# Patient Record
Sex: MALE | Race: WHITE | NOT HISPANIC OR LATINO | Employment: FULL TIME | URBAN - METROPOLITAN AREA
[De-identification: names, ages, dates, MRNs, and addresses within clinical notes are randomized per-mention and may not be internally consistent; named-entity substitution may affect disease eponyms.]

---

## 2018-02-12 ENCOUNTER — HOSPITAL ENCOUNTER (EMERGENCY)
Facility: MEDICAL CENTER | Age: 23
End: 2018-02-12
Payer: COMMERCIAL

## 2018-02-12 VITALS
HEART RATE: 88 BPM | BODY MASS INDEX: 22.5 KG/M2 | WEIGHT: 184.75 LBS | HEIGHT: 76 IN | OXYGEN SATURATION: 95 % | TEMPERATURE: 96.6 F | DIASTOLIC BLOOD PRESSURE: 87 MMHG | SYSTOLIC BLOOD PRESSURE: 134 MMHG | RESPIRATION RATE: 18 BRPM

## 2018-02-12 PROCEDURE — 302449 STATCHG TRIAGE ONLY (STATISTIC)

## 2018-02-12 ASSESSMENT — PAIN SCALES - GENERAL
PAINLEVEL_OUTOF10: 4
PAINLEVEL_OUTOF10: 3

## 2018-02-12 NOTE — ED TRIAGE NOTES
Pt ambulated to triage with   Chief Complaint   Patient presents with   • Seizure     last on approx 2 months ago, had one this morning.  pt was sitting on the couch, nausea, falls forward and tonic clonic seizure - witness, approx 2 mins per person with pt.  pt denies taking any medications,  no neurologist. pt a/o x4 now.  family discreibes postical period     Pt has abrasion to fore head and nose.  Pt Informed regarding triage process and verbalized understanding to inform triage tech or RN for any changes in condition. Placed in lobby.

## 2018-02-14 ENCOUNTER — OFFICE VISIT (OUTPATIENT)
Dept: URGENT CARE | Facility: PHYSICIAN GROUP | Age: 23
End: 2018-02-14
Payer: COMMERCIAL

## 2018-02-14 VITALS
HEART RATE: 60 BPM | OXYGEN SATURATION: 98 % | RESPIRATION RATE: 16 BRPM | DIASTOLIC BLOOD PRESSURE: 74 MMHG | SYSTOLIC BLOOD PRESSURE: 122 MMHG | TEMPERATURE: 98.8 F | BODY MASS INDEX: 23.75 KG/M2 | WEIGHT: 191 LBS | HEIGHT: 75 IN

## 2018-02-14 DIAGNOSIS — G40.909 SEIZURE DISORDER (HCC): ICD-10-CM

## 2018-02-14 PROCEDURE — 99214 OFFICE O/P EST MOD 30 MIN: CPT | Performed by: FAMILY MEDICINE

## 2018-02-14 RX ORDER — LEVETIRACETAM 500 MG/1
500 TABLET ORAL 2 TIMES DAILY
Qty: 60 TAB | Refills: 2 | Status: SHIPPED | OUTPATIENT
Start: 2018-02-14 | End: 2018-07-20

## 2018-02-14 ASSESSMENT — ENCOUNTER SYMPTOMS
EYE DISCHARGE: 0
WEIGHT LOSS: 0
SENSORY CHANGE: 0
EYE REDNESS: 0
FOCAL WEAKNESS: 0

## 2018-02-14 NOTE — PROGRESS NOTES
"Subjective:      Ezio Israel is a 22 y.o. male who presents with Seizure (pt states he has multiple over the last 3 months)            Patient suspects had a seizure at 19y/o not evaluated. Has had 3 tonic/clonic seizures in the last 3 months. Has associated tongue trauma and incontinence with the first episode slightly before thanksgiving. Patient does drink alcohol and smokes marijuana but hasn't seen a direct correlation. No new medications. No hot tub or change in ambient temperature. Post ictal states have resolved.   No significant head trauma.         Review of Systems   Constitutional: Negative for malaise/fatigue and weight loss.   Eyes: Negative for discharge and redness.   Skin: Negative for itching and rash.   Neurological: Negative for sensory change and focal weakness.       .  Medications, Allergies, and current problem list reviewed today in Epic     Objective:     /74   Pulse 60   Temp 37.1 °C (98.8 °F)   Resp 16   Ht 1.892 m (6' 2.5\")   Wt 86.6 kg (191 lb)   SpO2 98%   BMI 24.19 kg/m²      Physical Exam   Constitutional: He is oriented to person, place, and time. He appears well-developed and well-nourished. No distress.   HENT:   Head: Normocephalic.   Right Ear: External ear normal.   Left Ear: External ear normal.   Nose: Nose normal.   Mouth/Throat: Oropharynx is clear and moist.   Healing abrasions to face   Eyes: Conjunctivae and EOM are normal. Pupils are equal, round, and reactive to light.   Neck: Neck supple. No thyromegaly present.   Cardiovascular: Normal rate, regular rhythm and normal heart sounds.    Pulmonary/Chest: Breath sounds normal. He has no wheezes.   Neurological: He is alert and oriented to person, place, and time. No cranial nerve deficit.   Speech is clear. Patient is appropriate and cooperative.  Gait stable   No focal deficits   Skin: Skin is warm and dry. No rash noted.               Assessment/Plan:     1. Seizure disorder (CMS-[facility])  CBC WITH " DIFFERENTIAL    COMP METABOLIC PANEL    MAGNESIUM    REFERRAL TO NEURODIAGNOSTICS (EEG,EP,EMG/NCS/DBS) Modality Requested: EEG    MR-BRAIN-WITH & W/O    levETIRAcetam (KEPPRA) 500 MG Tab    REFERRAL TO NEUROLOGY     Differential diagnosis, natural history, supportive care, and indications for immediate follow-up discussed at length.   Case was discussed with [facility]  DMV paperwork filled out/no driving until further evaluation

## 2018-03-01 ENCOUNTER — HOSPITAL ENCOUNTER (EMERGENCY)
Facility: MEDICAL CENTER | Age: 23
End: 2018-03-01
Attending: EMERGENCY MEDICINE
Payer: COMMERCIAL

## 2018-03-01 ENCOUNTER — APPOINTMENT (OUTPATIENT)
Dept: RADIOLOGY | Facility: MEDICAL CENTER | Age: 23
End: 2018-03-01
Attending: EMERGENCY MEDICINE
Payer: COMMERCIAL

## 2018-03-01 VITALS
SYSTOLIC BLOOD PRESSURE: 116 MMHG | OXYGEN SATURATION: 100 % | WEIGHT: 180 LBS | HEIGHT: 75 IN | BODY MASS INDEX: 22.38 KG/M2 | HEART RATE: 61 BPM | TEMPERATURE: 97.7 F | RESPIRATION RATE: 16 BRPM | DIASTOLIC BLOOD PRESSURE: 87 MMHG

## 2018-03-01 DIAGNOSIS — R56.9 SEIZURE (HCC): ICD-10-CM

## 2018-03-01 LAB
ALBUMIN SERPL BCP-MCNC: 4.6 G/DL (ref 3.2–4.9)
ALBUMIN/GLOB SERPL: 1.7 G/DL
ALP SERPL-CCNC: 81 U/L (ref 30–99)
ALT SERPL-CCNC: 24 U/L (ref 2–50)
ANION GAP SERPL CALC-SCNC: 14 MMOL/L (ref 0–11.9)
AST SERPL-CCNC: 39 U/L (ref 12–45)
BASOPHILS # BLD AUTO: 0.4 % (ref 0–1.8)
BASOPHILS # BLD: 0.02 K/UL (ref 0–0.12)
BILIRUB SERPL-MCNC: 0.7 MG/DL (ref 0.1–1.5)
BUN SERPL-MCNC: 8 MG/DL (ref 8–22)
CALCIUM SERPL-MCNC: 9.1 MG/DL (ref 8.5–10.5)
CHLORIDE SERPL-SCNC: 104 MMOL/L (ref 96–112)
CO2 SERPL-SCNC: 19 MMOL/L (ref 20–33)
CREAT SERPL-MCNC: 1 MG/DL (ref 0.5–1.4)
EOSINOPHIL # BLD AUTO: 0.07 K/UL (ref 0–0.51)
EOSINOPHIL NFR BLD: 1.2 % (ref 0–6.9)
ERYTHROCYTE [DISTWIDTH] IN BLOOD BY AUTOMATED COUNT: 40.7 FL (ref 35.9–50)
ETHANOL BLD-MCNC: 0.01 G/DL
GLOBULIN SER CALC-MCNC: 2.7 G/DL (ref 1.9–3.5)
GLUCOSE SERPL-MCNC: 123 MG/DL (ref 65–99)
HCT VFR BLD AUTO: 46.6 % (ref 42–52)
HGB BLD-MCNC: 16.1 G/DL (ref 14–18)
IMM GRANULOCYTES # BLD AUTO: 0.02 K/UL (ref 0–0.11)
IMM GRANULOCYTES NFR BLD AUTO: 0.4 % (ref 0–0.9)
LYMPHOCYTES # BLD AUTO: 2.27 K/UL (ref 1–4.8)
LYMPHOCYTES NFR BLD: 40.5 % (ref 22–41)
MCH RBC QN AUTO: 31.1 PG (ref 27–33)
MCHC RBC AUTO-ENTMCNC: 34.5 G/DL (ref 33.7–35.3)
MCV RBC AUTO: 90 FL (ref 81.4–97.8)
MONOCYTES # BLD AUTO: 0.5 K/UL (ref 0–0.85)
MONOCYTES NFR BLD AUTO: 8.9 % (ref 0–13.4)
NEUTROPHILS # BLD AUTO: 2.73 K/UL (ref 1.82–7.42)
NEUTROPHILS NFR BLD: 48.6 % (ref 44–72)
NRBC # BLD AUTO: 0 K/UL
NRBC BLD-RTO: 0 /100 WBC
PLATELET # BLD AUTO: 107 K/UL (ref 164–446)
PMV BLD AUTO: 12.3 FL (ref 9–12.9)
POTASSIUM SERPL-SCNC: 4.1 MMOL/L (ref 3.6–5.5)
PROT SERPL-MCNC: 7.3 G/DL (ref 6–8.2)
RBC # BLD AUTO: 5.18 M/UL (ref 4.7–6.1)
SODIUM SERPL-SCNC: 137 MMOL/L (ref 135–145)
WBC # BLD AUTO: 5.6 K/UL (ref 4.8–10.8)

## 2018-03-01 PROCEDURE — 70450 CT HEAD/BRAIN W/O DYE: CPT

## 2018-03-01 PROCEDURE — 99284 EMERGENCY DEPT VISIT MOD MDM: CPT

## 2018-03-01 PROCEDURE — 36415 COLL VENOUS BLD VENIPUNCTURE: CPT

## 2018-03-01 PROCEDURE — 96374 THER/PROPH/DIAG INJ IV PUSH: CPT

## 2018-03-01 PROCEDURE — 85025 COMPLETE CBC W/AUTO DIFF WBC: CPT

## 2018-03-01 PROCEDURE — 700111 HCHG RX REV CODE 636 W/ 250 OVERRIDE (IP): Performed by: EMERGENCY MEDICINE

## 2018-03-01 PROCEDURE — 80307 DRUG TEST PRSMV CHEM ANLYZR: CPT

## 2018-03-01 PROCEDURE — 80053 COMPREHEN METABOLIC PANEL: CPT

## 2018-03-01 RX ORDER — LORAZEPAM 2 MG/ML
0.5 INJECTION INTRAMUSCULAR ONCE
Status: COMPLETED | OUTPATIENT
Start: 2018-03-01 | End: 2018-03-01

## 2018-03-01 RX ADMIN — LORAZEPAM 0.5 MG: 2 INJECTION INTRAMUSCULAR; INTRAVENOUS at 10:16

## 2018-03-01 ASSESSMENT — PAIN SCALES - GENERAL: PAINLEVEL_OUTOF10: 0

## 2018-03-01 NOTE — DISCHARGE INSTRUCTIONS
Seizure, Adult  A seizure is a sudden burst of abnormal electrical activity in the brain. The abnormal activity temporarily interrupts normal brain function, causing a person to experience any of the following:  · Involuntary movements.  · Changes in awareness or consciousness.  · Uncontrollable shaking (convulsions).  Seizures usually last from 30 seconds to 2 minutes. They usually do not cause permanent brain damage unless they are prolonged.  What can cause a seizure to happen?  Seizures can happen for many reasons including:  · A fever.  · Low blood sugar.  · A medicine.  · An illnesses.  · A brain injury.  Some people who have a seizure never have another one. People who have repeated seizures have a condition called epilepsy.  What are the symptoms of a seizure?  Symptoms of a seizure vary greatly from person to person. They include:  · Convulsions.  · Stiffening of the body.  · Involuntary movements of the arms or legs.  · Loss of consciousness.  · Breathing problems.  · Falling suddenly.  · Confusion.  · Head nodding.  · Eye blinking or fluttering.  · Lip smacking.  · Drooling.  · Rapid eye movements.  · Grunting.  · Loss of bladder control and bowel control.  · Staring.  · Unresponsiveness.  Some people have symptoms right before a seizure happens (aura) and right after a seizure happens. Symptoms of an aura include:  · Fear or anxiety.  · Nausea.  · Feeling like the room is spinning (vertigo).  · A feeling of having seen or heard something before (matt vu).  · Odd tastes or smells.  · Changes in vision, such as seeing flashing lights or spots.  Symptoms that may follow a seizure include:  · Confusion.  · Sleepiness.  · Headache.  · Weakness of one side of the body.  Follow these instructions at home:  Medicines  · Take over-the-counter and prescription medicines only as told by your health care provider.  · Avoid any substances that may prevent your medicine from working properly, such as  alcohol.  Activity  · Do not drive, swim, or do any other activities that would be dangerous if you had another seizure. Wait until your health care provider approves.  · If you live in the [place] check with your local DMV (department of motor vehicles) to find out about the local driving laws. Each state has specific rules about when you can legally return to driving.  · Get enough rest. Lack of sleep can make seizures more likely to occur.  Educating others  Teach friends and family what to do if you have a seizure. They should:  · Lay you on the ground to prevent a fall.  · Cushion your head and body.  · Loosen any tight clothing around your neck.  · Turn you on your side. If vomiting occurs, this helps keep your airway clear.  · Stay with you until you recover.  · Not hold you down. Holding you down will not stop the seizure.  · Not put anything in your mouth.  · Know whether or not you need emergency care.  General instructions  · Contact your health care provider each time you have a seizure.  · Avoid anything that has ever triggered a seizure for you.  · Keep a seizure diary. Record what you remember about each seizure, especially anything that might have triggered the seizure.  · Keep all follow-up visits as told by your health care provider. This is important.  Contact a health care provider if:  · You have another seizure.  · You have seizures more often.  · Your seizure symptoms change.  · You continue to have seizures with treatment.  · You have symptoms of an infection or illness. They might increase your risk of having a seizure.  Get help right away if:  · You have a seizure:  ¨ That lasts longer than 5 minutes.  ¨ That is different than previous seizures.  ¨ That leaves you unable to speak or use a part of your body.  ¨ That makes it harder to breathe.  ¨ After a head injury.  · You have:  ¨ Multiple seizures in a row.  ¨ Confusion or a severe headache right after a seizure.  · You are having seizures  more often.  · You do not wake up immediately after a seizure.  · You injure yourself during a seizure.  These symptoms may represent a serious problem that is an emergency. Do not wait to see if the symptoms will go away. Get medical help right away. Call your local emergency services (911 in the [place] Do not drive yourself to the hospital.   This information is not intended to replace advice given to you by your health care provider. Make sure you discuss any questions you have with your health care provider.  Document Released: 12/15/2001 Document Revised: 08/13/2017 Document Reviewed: 07/21/2017  Elsevier Interactive Patient Education © 2017 [employer]

## 2018-03-01 NOTE — ED PROVIDER NOTES
ED Provider Note    CHIEF COMPLAINT  Chief Complaint   Patient presents with   • Seizure     Witnessed seizure, hx of, taking Keppra as prescribed, bs 127   • GLF     abrasion to chin and forehead       HPI  Ezio Israel is a 22 y.o. male who presents for evaluation after seizure. The patient is here with his girlfriend. He had a seizure 2 weeks ago and was seen at an urgent care. Evidently they contacted a neurologist and the patient was started on Keppra which he has been taking. He has an outpatient workup scheduled but it sounds like it's not until June of this year. Today he had a witnessed seizure. He sustained some trauma but no incontinence. He states these feeling better at this time. Prior to seizure 2 weeks ago his previous seizure was approximately 9 months ago. It sounds like he's been having seizures for years. He states he is a daily drinker. Unclear whether this could be alcohol withdrawal related but I think it is unlikely.    REVIEW OF SYSTEMS  See HPI for further details. All other systems are negative.     PAST MEDICAL HISTORY  Past Medical History:   Diagnosis Date   • Seizure disorder (CMS-[facility])        FAMILY HISTORY  History reviewed. No pertinent family history.    SOCIAL HISTORY  Social History     Social History   • Marital status: Single     Spouse name: N/A   • Number of children: N/A   • Years of education: N/A     Social History Main Topics   • Smoking status: Current Every Day Smoker     Packs/day: 0.50   • Smokeless tobacco: Never Used   • Alcohol use Yes      Comment: occ   • Drug use: Yes     Types: Inhaled      Comment: marijuana    • Sexual activity: Not on file     Other Topics Concern   • Not on file     Social History Narrative   • No narrative on file       SURGICAL HISTORY  History reviewed. No pertinent surgical history.    CURRENT MEDICATIONS  Home Medications     Reviewed by Jia Qureshi R.N. (Registered Nurse) on 03/01/18 at 0940  Med List Status: Complete  "  Medication Last Dose Status   levETIRAcetam (KEPPRA) 500 MG Tab 2/28/2018 Active                ALLERGIES  Allergies   Allergen Reactions   • Augmentin    • Lortab [Hydrocodone-Acetaminophen] Rash       PHYSICAL EXAM  VITAL SIGNS: /87   Pulse 63   Temp 36.5 °C (97.7 °F)   Resp 12   Ht 1.905 m (6' 3\")   Wt 81.6 kg (180 lb)   SpO2 96%   BMI 22.50 kg/m²     Constitutional: Well developed, Well nourished, No acute distress, Non-toxic appearance.   HENT: Normocephalic, area of erythema to his forehead from his last seizure 2 weeks ago. Oral abrasion with no active bleeding.  Eyes:  EOMI, Conjunctiva normal, No discharge.   Neck: Normal range of motion.  Cardiovascular: Normal heart rate.   Thorax & Lungs: No respiratory distress.   Abdomen: Soft and nontender.  Skin: Warm, Dry.   Musculoskeletal: Good range of motion in all major joints.  Neurologic: Awake alert and oriented x 3. Cranial nerves II through XII are intact. Normal motor function, No focal deficits noted.       RADIOLOGY/PROCEDURES  CT-HEAD W/O   Final Result      No acute intracranial abnormality is identified.            COURSE & MEDICAL DECISION MAKING  Pertinent Labs & Imaging studies reviewed. (See chart for details)  This 22-year-old here for evaluation after having a witnessed seizure. Laboratories are obtained. His chemistries which are normal with exception of a glucose of 123 and a bicarb being minimally low at 19 which would be consistent with a seizure. Diagnostic alcohol of 0.01. CBC shows a normal white count and differential with a platelet count being low at 107. CT scan of the head shows no acute intracranial processes. Patient is treated with Ativan here. Upon repeat evaluation he states he feels fine. He is at his baseline. We discussed results of all the studies. We discussed the possibility of alcohol withdrawal seizures which I think is probably unlikely. He is scheduled for outpatient workup which I think is appropriate. " I will have him continue his Keppra and follow up as scheduled. He is given a discharge instruction sheet on seizures.    FINAL IMPRESSION  1. Seizure  2.   3.         Electronically signed by: Kunal Luo, 3/1/2018 10:15 AM

## 2018-03-01 NOTE — ED TRIAGE NOTES
".  Chief Complaint   Patient presents with   • Seizure     Witnessed seizure, hx of, taking Keppra as prescribed, bs 127   • GLF     abrasion to chin and forehead     ./87   Pulse 69   Temp 36.5 °C (97.7 °F)   Resp 16   Ht 1.905 m (6' 3\")   Wt 81.6 kg (180 lb)   SpO2 97%   BMI 22.50 kg/m²     BIB EMS with above complaints, post ictal on scene per EMS report, AAOx4 at this time, abrasion to forehead from previous seizure per patient, no oral trauma, no incontinence, VSS on RA, PIV in place.  "

## 2018-07-20 ENCOUNTER — HOSPITAL ENCOUNTER (EMERGENCY)
Facility: MEDICAL CENTER | Age: 23
End: 2018-07-20
Attending: EMERGENCY MEDICINE
Payer: COMMERCIAL

## 2018-07-20 ENCOUNTER — HOSPITAL ENCOUNTER (INPATIENT)
Facility: MEDICAL CENTER | Age: 23
LOS: 2 days | DRG: 101 | End: 2018-07-22
Attending: EMERGENCY MEDICINE | Admitting: INTERNAL MEDICINE
Payer: COMMERCIAL

## 2018-07-20 ENCOUNTER — APPOINTMENT (OUTPATIENT)
Dept: RADIOLOGY | Facility: MEDICAL CENTER | Age: 23
DRG: 101 | End: 2018-07-20
Attending: STUDENT IN AN ORGANIZED HEALTH CARE EDUCATION/TRAINING PROGRAM
Payer: COMMERCIAL

## 2018-07-20 ENCOUNTER — APPOINTMENT (OUTPATIENT)
Dept: RADIOLOGY | Facility: MEDICAL CENTER | Age: 23
DRG: 101 | End: 2018-07-20
Attending: EMERGENCY MEDICINE
Payer: COMMERCIAL

## 2018-07-20 VITALS
HEART RATE: 82 BPM | BODY MASS INDEX: 21.31 KG/M2 | WEIGHT: 175 LBS | SYSTOLIC BLOOD PRESSURE: 142 MMHG | TEMPERATURE: 97.2 F | DIASTOLIC BLOOD PRESSURE: 101 MMHG | HEIGHT: 76 IN | OXYGEN SATURATION: 97 % | RESPIRATION RATE: 35 BRPM

## 2018-07-20 DIAGNOSIS — S01.01XA LACERATION OF SCALP, INITIAL ENCOUNTER: ICD-10-CM

## 2018-07-20 DIAGNOSIS — G40.909 SEIZURE DISORDER (HCC): ICD-10-CM

## 2018-07-20 DIAGNOSIS — R56.9 SEIZURES (HCC): ICD-10-CM

## 2018-07-20 DIAGNOSIS — R56.9 SEIZURE (HCC): ICD-10-CM

## 2018-07-20 PROBLEM — E87.20 METABOLIC ACIDOSIS: Status: ACTIVE | Noted: 2018-07-20

## 2018-07-20 PROBLEM — D72.829 LEUKOCYTOSIS: Status: ACTIVE | Noted: 2018-07-20

## 2018-07-20 LAB
ALBUMIN SERPL BCP-MCNC: 4.7 G/DL (ref 3.2–4.9)
ALBUMIN/GLOB SERPL: 1.8 G/DL
ALP SERPL-CCNC: 83 U/L (ref 30–99)
ALT SERPL-CCNC: 15 U/L (ref 2–50)
ANION GAP SERPL CALC-SCNC: 20 MMOL/L (ref 0–11.9)
APTT PPP: 25.4 SEC (ref 24.7–36)
AST SERPL-CCNC: 27 U/L (ref 12–45)
BASOPHILS # BLD AUTO: 0.3 % (ref 0–1.8)
BASOPHILS # BLD: 0.04 K/UL (ref 0–0.12)
BILIRUB SERPL-MCNC: 0.9 MG/DL (ref 0.1–1.5)
BUN SERPL-MCNC: 8 MG/DL (ref 8–22)
CALCIUM SERPL-MCNC: 9.3 MG/DL (ref 8.5–10.5)
CHLORIDE SERPL-SCNC: 105 MMOL/L (ref 96–112)
CK SERPL-CCNC: 130 U/L (ref 0–154)
CO2 SERPL-SCNC: 14 MMOL/L (ref 20–33)
CREAT SERPL-MCNC: 0.99 MG/DL (ref 0.5–1.4)
EOSINOPHIL # BLD AUTO: 0.01 K/UL (ref 0–0.51)
EOSINOPHIL NFR BLD: 0.1 % (ref 0–6.9)
ERYTHROCYTE [DISTWIDTH] IN BLOOD BY AUTOMATED COUNT: 43.6 FL (ref 35.9–50)
ETHANOL BLD-MCNC: 0.01 G/DL
GLOBULIN SER CALC-MCNC: 2.6 G/DL (ref 1.9–3.5)
GLUCOSE SERPL-MCNC: 138 MG/DL (ref 65–99)
HCT VFR BLD AUTO: 47.2 % (ref 42–52)
HGB BLD-MCNC: 16.5 G/DL (ref 14–18)
IMM GRANULOCYTES # BLD AUTO: 0.06 K/UL (ref 0–0.11)
IMM GRANULOCYTES NFR BLD AUTO: 0.4 % (ref 0–0.9)
INR PPP: 1.09 (ref 0.87–1.13)
LACTATE BLD-SCNC: 0.9 MMOL/L (ref 0.5–2)
LYMPHOCYTES # BLD AUTO: 1.83 K/UL (ref 1–4.8)
LYMPHOCYTES NFR BLD: 12.9 % (ref 22–41)
MCH RBC QN AUTO: 32.5 PG (ref 27–33)
MCHC RBC AUTO-ENTMCNC: 35 G/DL (ref 33.7–35.3)
MCV RBC AUTO: 92.9 FL (ref 81.4–97.8)
MONOCYTES # BLD AUTO: 1.03 K/UL (ref 0–0.85)
MONOCYTES NFR BLD AUTO: 7.3 % (ref 0–13.4)
NEUTROPHILS # BLD AUTO: 11.18 K/UL (ref 1.82–7.42)
NEUTROPHILS NFR BLD: 79 % (ref 44–72)
NRBC # BLD AUTO: 0 K/UL
NRBC BLD-RTO: 0 /100 WBC
PLATELET # BLD AUTO: 132 K/UL (ref 164–446)
PMV BLD AUTO: 12.8 FL (ref 9–12.9)
POTASSIUM SERPL-SCNC: 3.3 MMOL/L (ref 3.6–5.5)
PROT SERPL-MCNC: 7.3 G/DL (ref 6–8.2)
PROTHROMBIN TIME: 13.8 SEC (ref 12–14.6)
RBC # BLD AUTO: 5.08 M/UL (ref 4.7–6.1)
SODIUM SERPL-SCNC: 139 MMOL/L (ref 135–145)
WBC # BLD AUTO: 14.2 K/UL (ref 4.8–10.8)

## 2018-07-20 PROCEDURE — 93005 ELECTROCARDIOGRAM TRACING: CPT | Performed by: STUDENT IN AN ORGANIZED HEALTH CARE EDUCATION/TRAINING PROGRAM

## 2018-07-20 PROCEDURE — 700111 HCHG RX REV CODE 636 W/ 250 OVERRIDE (IP): Performed by: STUDENT IN AN ORGANIZED HEALTH CARE EDUCATION/TRAINING PROGRAM

## 2018-07-20 PROCEDURE — 70450 CT HEAD/BRAIN W/O DYE: CPT

## 2018-07-20 PROCEDURE — 99285 EMERGENCY DEPT VISIT HI MDM: CPT

## 2018-07-20 PROCEDURE — 83605 ASSAY OF LACTIC ACID: CPT

## 2018-07-20 PROCEDURE — 96375 TX/PRO/DX INJ NEW DRUG ADDON: CPT

## 2018-07-20 PROCEDURE — 85025 COMPLETE CBC W/AUTO DIFF WBC: CPT

## 2018-07-20 PROCEDURE — 82550 ASSAY OF CK (CPK): CPT

## 2018-07-20 PROCEDURE — 304999 HCHG REPAIR-SIMPLE/INTERMED LEVEL 1

## 2018-07-20 PROCEDURE — A9579 GAD-BASE MR CONTRAST NOS,1ML: HCPCS | Performed by: STUDENT IN AN ORGANIZED HEALTH CARE EDUCATION/TRAINING PROGRAM

## 2018-07-20 PROCEDURE — 70553 MRI BRAIN STEM W/O & W/DYE: CPT

## 2018-07-20 PROCEDURE — 305308 HCHG STAPLER,SKIN,DISP.

## 2018-07-20 PROCEDURE — 85610 PROTHROMBIN TIME: CPT

## 2018-07-20 PROCEDURE — 700105 HCHG RX REV CODE 258: Performed by: STUDENT IN AN ORGANIZED HEALTH CARE EDUCATION/TRAINING PROGRAM

## 2018-07-20 PROCEDURE — 700117 HCHG RX CONTRAST REV CODE 255: Performed by: STUDENT IN AN ORGANIZED HEALTH CARE EDUCATION/TRAINING PROGRAM

## 2018-07-20 PROCEDURE — 0HQ0XZZ REPAIR SCALP SKIN, EXTERNAL APPROACH: ICD-10-PCS | Performed by: EMERGENCY MEDICINE

## 2018-07-20 PROCEDURE — 85730 THROMBOPLASTIN TIME PARTIAL: CPT

## 2018-07-20 PROCEDURE — 304217 HCHG IRRIGATION SYSTEM

## 2018-07-20 PROCEDURE — 700111 HCHG RX REV CODE 636 W/ 250 OVERRIDE (IP): Performed by: EMERGENCY MEDICINE

## 2018-07-20 PROCEDURE — 700102 HCHG RX REV CODE 250 W/ 637 OVERRIDE(OP): Performed by: INTERNAL MEDICINE

## 2018-07-20 PROCEDURE — A9270 NON-COVERED ITEM OR SERVICE: HCPCS | Performed by: INTERNAL MEDICINE

## 2018-07-20 PROCEDURE — 93010 ELECTROCARDIOGRAM REPORT: CPT | Performed by: INTERNAL MEDICINE

## 2018-07-20 PROCEDURE — 96374 THER/PROPH/DIAG INJ IV PUSH: CPT

## 2018-07-20 PROCEDURE — 770006 HCHG ROOM/CARE - MED/SURG/GYN SEMI*

## 2018-07-20 PROCEDURE — 80307 DRUG TEST PRSMV CHEM ANLYZR: CPT

## 2018-07-20 PROCEDURE — 36415 COLL VENOUS BLD VENIPUNCTURE: CPT

## 2018-07-20 PROCEDURE — 700101 HCHG RX REV CODE 250: Performed by: EMERGENCY MEDICINE

## 2018-07-20 PROCEDURE — 80053 COMPREHEN METABOLIC PANEL: CPT

## 2018-07-20 PROCEDURE — 99284 EMERGENCY DEPT VISIT MOD MDM: CPT

## 2018-07-20 RX ORDER — ONDANSETRON 4 MG/1
4 TABLET, ORALLY DISINTEGRATING ORAL
Status: DISCONTINUED | OUTPATIENT
Start: 2018-07-20 | End: 2018-07-22 | Stop reason: HOSPADM

## 2018-07-20 RX ORDER — VALPROIC ACID 250 MG/1
250 CAPSULE, LIQUID FILLED ORAL EVERY 6 HOURS
Status: DISCONTINUED | OUTPATIENT
Start: 2018-07-20 | End: 2018-07-22 | Stop reason: HOSPADM

## 2018-07-20 RX ORDER — POLYETHYLENE GLYCOL 3350 17 G/17G
1 POWDER, FOR SOLUTION ORAL
Status: DISCONTINUED | OUTPATIENT
Start: 2018-07-20 | End: 2018-07-22 | Stop reason: HOSPADM

## 2018-07-20 RX ORDER — SODIUM CHLORIDE 9 MG/ML
INJECTION, SOLUTION INTRAVENOUS CONTINUOUS
Status: DISCONTINUED | OUTPATIENT
Start: 2018-07-20 | End: 2018-07-22 | Stop reason: HOSPADM

## 2018-07-20 RX ORDER — AMOXICILLIN 250 MG
2 CAPSULE ORAL 2 TIMES DAILY
Status: DISCONTINUED | OUTPATIENT
Start: 2018-07-20 | End: 2018-07-22 | Stop reason: HOSPADM

## 2018-07-20 RX ORDER — LIDOCAINE HYDROCHLORIDE AND EPINEPHRINE 10; 10 MG/ML; UG/ML
20 INJECTION, SOLUTION INFILTRATION; PERINEURAL ONCE
Status: COMPLETED | OUTPATIENT
Start: 2018-07-20 | End: 2018-07-20

## 2018-07-20 RX ORDER — ONDANSETRON 2 MG/ML
4 INJECTION INTRAMUSCULAR; INTRAVENOUS ONCE
Status: COMPLETED | OUTPATIENT
Start: 2018-07-20 | End: 2018-07-20

## 2018-07-20 RX ORDER — LORAZEPAM 2 MG/ML
2 INJECTION INTRAMUSCULAR EVERY 4 HOURS PRN
Status: DISCONTINUED | OUTPATIENT
Start: 2018-07-20 | End: 2018-07-22 | Stop reason: HOSPADM

## 2018-07-20 RX ORDER — LORAZEPAM 2 MG/ML
1 INJECTION INTRAMUSCULAR ONCE
Status: COMPLETED | OUTPATIENT
Start: 2018-07-20 | End: 2018-07-20

## 2018-07-20 RX ORDER — BISACODYL 10 MG
10 SUPPOSITORY, RECTAL RECTAL
Status: DISCONTINUED | OUTPATIENT
Start: 2018-07-20 | End: 2018-07-22 | Stop reason: HOSPADM

## 2018-07-20 RX ADMIN — LIDOCAINE HYDROCHLORIDE,EPINEPHRINE BITARTRATE 20 ML: 10; .01 INJECTION, SOLUTION INFILTRATION; PERINEURAL at 12:12

## 2018-07-20 RX ADMIN — VALPROIC ACID 250 MG: 250 CAPSULE, LIQUID FILLED ORAL at 20:39

## 2018-07-20 RX ADMIN — GADODIAMIDE 18 ML: 287 INJECTION INTRAVENOUS at 17:31

## 2018-07-20 RX ADMIN — ONDANSETRON HYDROCHLORIDE 4 MG: 2 INJECTION, SOLUTION INTRAMUSCULAR; INTRAVENOUS at 15:15

## 2018-07-20 RX ADMIN — LORAZEPAM 2 MG: 2 INJECTION INTRAMUSCULAR; INTRAVENOUS at 22:00

## 2018-07-20 RX ADMIN — LORAZEPAM 1 MG: 2 INJECTION INTRAMUSCULAR; INTRAVENOUS at 15:00

## 2018-07-20 RX ADMIN — SODIUM CHLORIDE: 9 INJECTION, SOLUTION INTRAVENOUS at 18:36

## 2018-07-20 ASSESSMENT — ENCOUNTER SYMPTOMS
CHILLS: 0
COUGH: 0
DOUBLE VISION: 0
BLURRED VISION: 1
VOMITING: 0
NECK PAIN: 0
EYE PAIN: 0
PALPITATIONS: 0
DIZZINESS: 1
DEPRESSION: 0
HALLUCINATIONS: 0
FEVER: 0
CONSTIPATION: 0
NERVOUS/ANXIOUS: 0
HEADACHES: 1
NAUSEA: 0
BRUISES/BLEEDS EASILY: 0
SHORTNESS OF BREATH: 0
HEARTBURN: 0
MYALGIAS: 0
ABDOMINAL PAIN: 0
PHOTOPHOBIA: 0
DIARRHEA: 0

## 2018-07-20 ASSESSMENT — COGNITIVE AND FUNCTIONAL STATUS - GENERAL
SUGGESTED CMS G CODE MODIFIER MOBILITY: CH
SUGGESTED CMS G CODE MODIFIER DAILY ACTIVITY: CH
DAILY ACTIVITIY SCORE: 24
MOBILITY SCORE: 24

## 2018-07-20 ASSESSMENT — COPD QUESTIONNAIRES
COPD SCREENING SCORE: 0
DURING THE PAST 4 WEEKS HOW MUCH DID YOU FEEL SHORT OF BREATH: NONE/LITTLE OF THE TIME
IN THE PAST 12 MONTHS DO YOU DO LESS THAN YOU USED TO BECAUSE OF YOUR BREATHING PROBLEMS: DISAGREE/UNSURE
HAVE YOU SMOKED AT LEAST 100 CIGARETTES IN YOUR ENTIRE LIFE: NO/DON'T KNOW
DO YOU EVER COUGH UP ANY MUCUS OR PHLEGM?: NO/ONLY WITH OCCASIONAL COLDS OR INFECTIONS

## 2018-07-20 ASSESSMENT — PATIENT HEALTH QUESTIONNAIRE - PHQ9
SUM OF ALL RESPONSES TO PHQ9 QUESTIONS 1 AND 2: 0
1. LITTLE INTEREST OR PLEASURE IN DOING THINGS: NOT AT ALL
2. FEELING DOWN, DEPRESSED, IRRITABLE, OR HOPELESS: NOT AT ALL

## 2018-07-20 ASSESSMENT — PAIN SCALES - GENERAL
PAINLEVEL_OUTOF10: 0
PAINLEVEL_OUTOF10: 2

## 2018-07-20 ASSESSMENT — LIFESTYLE VARIABLES
ALCOHOL_USE: NO
EVER_SMOKED: YES

## 2018-07-20 NOTE — ED TRIAGE NOTES
Witnessed seizure lasted approx 4 min according to bystanders. Pt fell and has 2 inch lac on the posterior occipital area of his head. Pt is A&Ox4 at this time.

## 2018-07-20 NOTE — ED NOTES
Med rec complete per Pt at bedside  Pt states he has not had any Keppra sense April  Pt stated it gave him a seizure almost every time he took Keppra  Allergie reviewed  No ABX in last 30 days

## 2018-07-20 NOTE — PROGRESS NOTES
EEG tech notified ANIKA Tsang that routine EEG will not be done until Monday being that it's after hours. RN also told that if it is determined that it needs to be done sooner neuro can be consulted in order for the on call EEG tech to come in to perform the study.

## 2018-07-20 NOTE — ED NOTES
Pt in bed, father at bedside. Discharge instructions given. Verbalizes understanding. Denies any questions or concerns at this time. Discharged to lobby without incident.

## 2018-07-20 NOTE — ED NOTES
Pt sitting up in bed, using cell phone. Updated on plan of care. Verbalizes understanding. Denies any further needs or concerns at this time.

## 2018-07-20 NOTE — ED PROVIDER NOTES
ED Provider Note    CHIEF COMPLAINT  Chief Complaint   Patient presents with   • ALOC     pt was dc'd here for seizures. pt now altered and diaphoretic in triage. pt impulsive and not following commands.        HPI  Ezio Israel is a 23 y.o. male who presents for the second time today after suffering from seizure going home from the emergency department.  He had had his first seizure in months earlier today, neurologic exam was normal, laceration repaired and he was sent home.  Patient is now again awake.  Length of seizure described as several minutes with following a postictal phase.  Patient denies new injury.  No fever, no chest pain, no shortness of breath.  No acute numbness or weakness    REVIEW OF SYSTEMS  Ear nose throat: No facial pain, no tongue bite  Respiratory: No shortness of breath  Gastrointestinal: No vomiting  Musculoskeletal: No neck pain, no extremity pain  Neurologic: Mild headache.  Seizures  Skin: Repaired scalp laceration     All other systems are negative.       PAST MEDICAL HISTORY  Past Medical History:   Diagnosis Date   • Seizure disorder (CMS-H[facility]) ([facility])        FAMILY HISTORY  No family history on file.    SOCIAL HISTORY  Social History     Social History   • Marital status: Single     Spouse name: N/A   • Number of children: N/A   • Years of education: N/A     Social History Main Topics   • Smoking status: Current Every Day Smoker     Packs/day: 0.50   • Smokeless tobacco: Never Used   • Alcohol use Yes      Comment: occ   • Drug use: Yes     Types: Inhaled      Comment: marijuana    • Sexual activity: Not on file     Other Topics Concern   • Not on file     Social History Narrative   • No narrative on file       SURGICAL HISTORY  No past surgical history on file.    CURRENT MEDICATIONS  No current facility-administered medications on file prior to encounter.      No current outpatient prescriptions on file prior to encounter.       ALLERGIES  Allergies   Allergen Reactions    • Augmentin    • Lortab [Hydrocodone-Acetaminophen] Rash       PHYSICAL EXAM  VITAL SIGNS: /83   Pulse (!) 52   Temp 36.6 °C (97.9 °F)   Resp 18   Wt 79.4 kg (175 lb 0.7 oz)   SpO2 93%   BMI 21.31 kg/m²    Constitutional: Well-nourished, no distress  HENT: Right parietal scalp laceration with repair.  No new facial trauma  Eyes: Pupils are equal 3 millimeters, Conjunctiva normal, No discharge.   Neck: Nontender.  Range of motion without difficulty or pain  Cardiovascular: Normal heart rate, Normal rhythm   Pulmonary: Equal  breath sounds, No wheezing or rales.  Normal air movement  GI: Soft and nontender.  No guarding  Skin: Scalp laceration  Vascular: Normal capillary refill all extremities  Musculoskeletal: Thoracic and lumbar spine nontender.  Ribs, pelvis, extremities are nontender  Neurologic: Sensation normal.  Strength normal.  Speech is clear.  Patient is alert and oriented to name place and time    RADIOLOGY/PROCEDURES  CT-HEAD W/O   Final Result      No intracranial mass effect or acute hemorrhage.      MR-BRAIN-WITH & W/O    (Results Pending)         Labs  Results for orders placed or performed during the hospital encounter of 07/20/18   CBC WITH DIFFERENTIAL   Result Value Ref Range    WBC 14.2 (H) 4.8 - 10.8 K/uL    RBC 5.08 4.70 - 6.10 M/uL    Hemoglobin 16.5 14.0 - 18.0 g/dL    Hematocrit 47.2 42.0 - 52.0 %    MCV 92.9 81.4 - 97.8 fL    MCH 32.5 27.0 - 33.0 pg    MCHC 35.0 33.7 - 35.3 g/dL    RDW 43.6 35.9 - 50.0 fL    Platelet Count 132 (L) 164 - 446 K/uL    MPV 12.8 9.0 - 12.9 fL    Neutrophils-Polys 79.00 (H) 44.00 - 72.00 %    Lymphocytes 12.90 (L) 22.00 - 41.00 %    Monocytes 7.30 0.00 - 13.40 %    Eosinophils 0.10 0.00 - 6.90 %    Basophils 0.30 0.00 - 1.80 %    Immature Granulocytes 0.40 0.00 - 0.90 %    Nucleated RBC 0.00 /100 WBC    Neutrophils (Absolute) 11.18 (H) 1.82 - 7.42 K/uL    Lymphs (Absolute) 1.83 1.00 - 4.80 K/uL    Monos (Absolute) 1.03 (H) 0.00 - 0.85 K/uL    Eos  (Absolute) 0.01 0.00 - 0.51 K/uL    Baso (Absolute) 0.04 0.00 - 0.12 K/uL    Immature Granulocytes (abs) 0.06 0.00 - 0.11 K/uL    NRBC (Absolute) 0.00 K/uL   COMP METABOLIC PANEL   Result Value Ref Range    Sodium 139 135 - 145 mmol/L    Potassium 3.3 (L) 3.6 - 5.5 mmol/L    Chloride 105 96 - 112 mmol/L    Co2 14 (L) 20 - 33 mmol/L    Anion Gap 20.0 (H) 0.0 - 11.9    Glucose 138 (H) 65 - 99 mg/dL    Bun 8 8 - 22 mg/dL    Creatinine 0.99 0.50 - 1.40 mg/dL    Calcium 9.3 8.5 - 10.5 mg/dL    AST(SGOT) 27 12 - 45 U/L    ALT(SGPT) 15 2 - 50 U/L    Alkaline Phosphatase 83 30 - 99 U/L    Total Bilirubin 0.9 0.1 - 1.5 mg/dL    Albumin 4.7 3.2 - 4.9 g/dL    Total Protein 7.3 6.0 - 8.2 g/dL    Globulin 2.6 1.9 - 3.5 g/dL    A-G Ratio 1.8 g/dL   PROTHROMBIN TIME   Result Value Ref Range    PT 13.8 12.0 - 14.6 sec    INR 1.09 0.87 - 1.13   APTT   Result Value Ref Range    APTT 25.4 24.7 - 36.0 sec   ESTIMATED GFR   Result Value Ref Range    GFR If African American >60 >60 mL/min/1.73 m 2    GFR If Non African American >60 >60 mL/min/1.73 m 2         COURSE & MEDICAL DECISION MAKING  Pertinent Labs & Imaging studies reviewed. (See chart for details)  Patient with second seizure today, plan for admission to the hospital for ongoing workup.  He received 1 mg of Ativan for prevention of further seizures.  CT scan of the head read as negative.  Lab work demonstrates leukocytosis of unknown etiology.  No hypoglycemia.  By history patient does not have reason to have withdrawal seizure.  Patient is admitted for ongoing workup and stabilization    FINAL IMPRESSION     1. Seizures (HCC)    2. Laceration of scalp, initial encounter              Electronically signed by: Loki Justin, 7/20/2018

## 2018-07-20 NOTE — DISCHARGE INSTRUCTIONS
Laceration Care, Adult  A laceration is a cut that goes through all of the layers of the skin and into the tissue that is right under the skin. Some lacerations heal on their own. Others need to be closed with stitches (sutures), staples, skin adhesive strips, or skin glue. Proper laceration care minimizes the risk of infection and helps the laceration to heal better.  HOW TO CARE FOR YOUR LACERATION  If sutures or staples were used:  · Keep the wound clean and dry.  · If you were given a bandage (dressing), you should change it at least one time per day or as told by your health care provider. You should also change it if it becomes wet or dirty.  · Keep the wound completely dry for the first 24 hours or as told by your health care provider. After that time, you may shower or bathe. However, make sure that the wound is not soaked in water until after the sutures or staples have been removed.  · Clean the wound one time each day or as told by your health care provider:  ¨ Wash the wound with soap and water.  ¨ Rinse the wound with water to remove all soap.  ¨ Pat the wound dry with a clean towel. Do not rub the wound.  · After cleaning the wound, apply a thin layer of antibiotic ointment as told by your health care provider. This will help to prevent infection and keep the dressing from sticking to the wound.  · Have the sutures or staples removed as told by your health care provider.  If skin adhesive strips were used:  · Keep the wound clean and dry.  · If you were given a bandage (dressing), you should change it at least one time per day or as told by your health care provider. You should also change it if it becomes dirty or wet.  · Do not get the skin adhesive strips wet. You may shower or bathe, but be careful to keep the wound dry.  · If the wound gets wet, pat it dry with a clean towel. Do not rub the wound.  · Skin adhesive strips fall off on their own. You may trim the strips as the wound heals. Do not  remove skin adhesive strips that are still stuck to the wound. They will fall off in time.  If skin glue was used:  · Try to keep the wound dry, but you may briefly wet it in the shower or bath. Do not soak the wound in water, such as by swimming.  · After you have showered or bathed, gently pat the wound dry with a clean towel. Do not rub the wound.  · Do not do any activities that will make you sweat heavily until the skin glue has fallen off on its own.  · Do not apply liquid, cream, or ointment medicine to the wound while the skin glue is in place. Using those may loosen the film before the wound has healed.  · If you were given a bandage (dressing), you should change it at least one time per day or as told by your health care provider. You should also change it if it becomes dirty or wet.  · If a dressing is placed over the wound, be careful not to apply tape directly over the skin glue. Doing that may cause the glue to be pulled off before the wound has healed.  · Do not pick at the glue. The skin glue usually remains in place for 5-10 days, then it falls off of the skin.  General Instructions  · Take over-the-counter and prescription medicines only as told by your health care provider.  · If you were prescribed an antibiotic medicine or ointment, take or apply it as told by your doctor. Do not stop using it even if your condition improves.  · To help prevent scarring, make sure to cover your wound with sunscreen whenever you are outside after stitches are removed, after adhesive strips are removed, or when glue remains in place and the wound is healed. Make sure to wear a sunscreen of at least 30 SPF.  · Do not scratch or pick at the wound.  · Keep all follow-up visits as told by your health care provider. This is important.  · Check your wound every day for signs of infection. Watch for:  ¨ Redness, swelling, or pain.  ¨ Fluid, blood, or pus.  · Raise (elevate) the injured area above the level of your heart  while you are sitting or lying down, if possible.  SEEK MEDICAL CARE IF:  · You received a tetanus shot and you have swelling, severe pain, redness, or bleeding at the injection site.  · You have a fever.  · A wound that was closed breaks open.  · You notice a bad smell coming from your wound or your dressing.  · You notice something coming out of the wound, such as wood or glass.  · Your pain is not controlled with medicine.  · You have increased redness, swelling, or pain at the site of your wound.  · You have fluid, blood, or pus coming from your wound.  · You notice a change in the color of your skin near your wound.  · You need to change the dressing frequently due to fluid, blood, or pus draining from the wound.  · You develop a new rash.  · You develop numbness around the wound.  SEEK IMMEDIATE MEDICAL CARE IF:  · You develop severe swelling around the wound.  · Your pain suddenly increases and is severe.  · You develop painful lumps near the wound or on skin that is anywhere on your body.  · You have a red streak going away from your wound.  · The wound is on your hand or foot and you cannot properly move a finger or toe.  · The wound is on your hand or foot and you notice that your fingers or toes look pale or bluish.     This information is not intended to replace advice given to you by your health care provider. Make sure you discuss any questions you have with your health care provider.     Document Released: 12/18/2006 Document Revised: 05/03/2016 Document Reviewed: 12/14/2015  [employer] Interactive Patient Education ©2016 [employer]    Seizure, Adult  When you have a seizure:  · Parts of your body may move.  · How aware or awake (conscious) you are may change.  · You may shake (convulse).  Some people have symptoms right before a seizure happens. These symptoms may include:  · Fear.  · Worry (anxiety).  · Feeling like you are going to throw up (nausea).  · Feeling like the room is spinning  (vertigo).  · Feeling like you saw or heard something before (matt vu).  · Odd tastes or smells.  · Changes in vision, such as seeing flashing lights or spots.  Seizures usually last from 30 seconds to 2 minutes. Usually, they are not harmful unless they last a long time.  Follow these instructions at home:  Medicines  · Take over-the-counter and prescription medicines only as told by your doctor.  · Avoid anything that may keep your medicine from working, such as alcohol.  Activity  · Do not do any activities that would be dangerous if you had another seizure, like driving or swimming. Wait until your doctor approves.  · If you live in the [place] ask your local DMV (department of [employer]) when you can drive.  · Rest.  Teaching others  · Teach friends and family what to do when you have a seizure. They should:  ¨ Lay you on the ground.  ¨ Protect your head and body.  ¨ Loosen any tight clothing around your neck.  ¨ Turn you on your side.  ¨ Stay with you until you are better.  ¨ Not hold you down.  ¨ Not put anything in your mouth.  ¨ Know whether or not you need emergency care.  General instructions  · Contact your doctor each time you have a seizure.  · Avoid anything that gives you seizures.  · Keep a seizure diary. Write down:  ¨ What you think caused each seizure.  ¨ What you remember about each seizure.  · Keep all follow-up visits as told by your doctor. This is important.  Contact a doctor if:  · You have another seizure.  · You have seizures more often.  · There is any change in what happens during your seizures.  · You continue to have seizures with treatment.  · You have symptoms of being sick or having an infection.  Get help right away if:  · You have a seizure:  ¨ That lasts longer than 5 minutes.  ¨ That is different than seizures you had before.  ¨ That makes it harder to breathe.  ¨ After you hurt your head.  · After a seizure, you cannot speak or use a part of your body.  · After a seizure,  you are confused or have a bad headache.  · You have two or more seizures in a row.  · You are having seizures more often.  · You do not wake up right after a seizure.  · You get hurt during a seizure.  In an emergency:  · These symptoms may be an emergency. Do not wait to see if the symptoms will go away. Get medical help right away. Call your local emergency services (911 in the [place] Do not drive yourself to the hospital.  This information is not intended to replace advice given to you by your health care provider. Make sure you discuss any questions you have with your health care provider.  Document Released: 06/05/2009 Document Revised: 08/30/2017 Document Reviewed: 08/30/2017  [employer] Interactive Patient Education © 2017 [employer]

## 2018-07-20 NOTE — ED PROVIDER NOTES
ED Provider Note    CHIEF COMPLAINT  Chief Complaint   Patient presents with   • Seizure       HPI  Ezio Israel is a 23 y.o. male who presents after grand mal seizure.  Patient was at work at the time.  He states he has had seizures for the past 5 years.  Earlier this year after an urgent care visit, took Keppra which she states increased the frequency of his seizures so much that he stopped the medication.  Patient states he does not drive because of the seizure condition.  He states he has never followed up with a neurologist.  He believes he had a CT scan in the past but denies EEG or specialty consultation.  No vision change.  He has mild right-sided headache.  Patient suffered a laceration today secondary to falling from his seizure.  Currently no neck pain.  No numbness or weakness in extremities.  No vision change.    REVIEW OF SYSTEMS  Neurologic: Headache, seizure  Eyes: No vision change  Ear nose throat: No facial pain  Gastrointestinal: No nausea or vomiting  Musculoskeletal: No neck pain or stiffness.  No extremity pain  Skin: Scalp laceration     All other systems are negative.        PAST MEDICAL HISTORY  Past Medical History:   Diagnosis Date   • Seizure disorder (CMS-HCC) ([facility])        FAMILY HISTORY  No family history on file.    SOCIAL HISTORY  Social History     Social History   • Marital status: Single     Spouse name: N/A   • Number of children: N/A   • Years of education: N/A     Social History Main Topics   • Smoking status: Current Every Day Smoker     Packs/day: 0.50   • Smokeless tobacco: Never Used   • Alcohol use Yes      Comment: occ   • Drug use: Yes     Types: Inhaled      Comment: marijuana    • Sexual activity: Not on file     Other Topics Concern   • Not on file     Social History Narrative   • No narrative on file       SURGICAL HISTORY  No past surgical history on file.    CURRENT MEDICATIONS  No current facility-administered medications on file prior to encounter.   "    No current outpatient prescriptions on file prior to encounter.       ALLERGIES  Allergies   Allergen Reactions   • Augmentin    • Lortab [Hydrocodone-Acetaminophen] Rash       PHYSICAL EXAM  VITAL SIGNS: /101   Pulse 82   Temp 36.2 °C (97.2 °F)   Resp (!) 35   Ht 1.93 m (6' 4\")   Wt 79.4 kg (175 lb)   SpO2 97%   BMI 21.30 kg/m²   Constitutional:  No acute distress, Non-toxic appearance.   HENT: 1 inch right parietal scalp laceration.  No bony crepitance or skull deformity.  No facial tenderness or facial trauma.  No tongue bite  Eyes: PERRLA, EOMI, Conjunctiva normal, No discharge.  Pupils are 3 mm bilateral.  No nystagmus  Musculoskeletal: No cervical neck tenderness, neck is supple.   Lymphatic: No lymphadenopathy.   Cardiovascular: Normal heart rate, Normal rhythm  Pulmonary: Normal breath sounds, No respiratory distress, No wheezing  Skin: Warm, Dry, No erythema, No rash.  Scalp laceration as above.  Neurologic: Sensation and strength are normal.  Speech is clear.  Patient is alert, oriented, cooperative.  Psychiatric: Affect normal, Mood normal.     RADIOLOGY/PROCEDURES  Laceration Repair Procedure Note    Indication: Laceration    Procedure: The patient was placed in the appropriate position and anesthesia around the laceration was obtained by infiltration using 1% Lidocaine with epinephrine. The area was then cleansed using betadine and irrigated with normal saline. The laceration was closed with staples. There were no additional lacerations requiring repair. The wound area was then dressed with bacitracin.      Total repaired wound length: 2.5 cm.     Other Items: Staple count: 4    The patient tolerated the procedure well.    Complications: None          COURSE & MEDICAL DECISION MAKING  Pertinent Labs & Imaging studies reviewed. (See chart for details)  Patient provided with staple remover.  He is neurologically intact, has had typical grand mal seizure, he states usual for him " intermittently over the past 5 years.  Patient is given referral to neurology for outpatient follow-up.  Given the chronic nature of his problem today with seizures, no further intervention including medication was prescribed as the patient stated last time this made his seizures worse.  Patient advised to have skin staples removed in 1 week    FINAL IMPRESSION     1. Laceration of scalp, initial encounter    2. Seizure (HCC)            Electronically signed by: Loki Justin, 7/20/2018 5:57 PM

## 2018-07-20 NOTE — ED TRIAGE NOTES
Chief Complaint   Patient presents with   • ALOC     pt was dc'd here for seizures. pt now altered and diaphoretic in triage. pt impulsive and not following commands.

## 2018-07-21 PROBLEM — S06.2X0A CONTUSION OF BRAIN WITHOUT LOSS OF CONSCIOUSNESS (HCC): Status: ACTIVE | Noted: 2018-07-21

## 2018-07-21 PROBLEM — F19.11 H/O: SUBSTANCE ABUSE (HCC): Status: ACTIVE | Noted: 2018-07-21

## 2018-07-21 PROBLEM — S06.2X9A CONTUSION OF BRAIN WITH LOSS OF CONSCIOUSNESS (HCC): Status: ACTIVE | Noted: 2018-07-21

## 2018-07-21 LAB
ALBUMIN SERPL BCP-MCNC: 4.1 G/DL (ref 3.2–4.9)
ALBUMIN/GLOB SERPL: 2 G/DL
ALP SERPL-CCNC: 67 U/L (ref 30–99)
ALT SERPL-CCNC: 13 U/L (ref 2–50)
AMPHET UR QL SCN: NEGATIVE
ANION GAP SERPL CALC-SCNC: 12 MMOL/L (ref 0–11.9)
AST SERPL-CCNC: 23 U/L (ref 12–45)
BARBITURATES UR QL SCN: NEGATIVE
BASOPHILS # BLD AUTO: 0.2 % (ref 0–1.8)
BASOPHILS # BLD: 0.02 K/UL (ref 0–0.12)
BENZODIAZ UR QL SCN: NEGATIVE
BILIRUB SERPL-MCNC: 1 MG/DL (ref 0.1–1.5)
BUN SERPL-MCNC: 9 MG/DL (ref 8–22)
BZE UR QL SCN: NEGATIVE
CALCIUM SERPL-MCNC: 8.7 MG/DL (ref 8.5–10.5)
CANNABINOIDS UR QL SCN: POSITIVE
CHLORIDE SERPL-SCNC: 105 MMOL/L (ref 96–112)
CO2 SERPL-SCNC: 20 MMOL/L (ref 20–33)
CREAT SERPL-MCNC: 1.06 MG/DL (ref 0.5–1.4)
EKG IMPRESSION: NORMAL
EOSINOPHIL # BLD AUTO: 0 K/UL (ref 0–0.51)
EOSINOPHIL NFR BLD: 0 % (ref 0–6.9)
ERYTHROCYTE [DISTWIDTH] IN BLOOD BY AUTOMATED COUNT: 43.5 FL (ref 35.9–50)
GLOBULIN SER CALC-MCNC: 2.1 G/DL (ref 1.9–3.5)
GLUCOSE SERPL-MCNC: 97 MG/DL (ref 65–99)
HCT VFR BLD AUTO: 41.6 % (ref 42–52)
HGB BLD-MCNC: 14.4 G/DL (ref 14–18)
IMM GRANULOCYTES # BLD AUTO: 0.04 K/UL (ref 0–0.11)
IMM GRANULOCYTES NFR BLD AUTO: 0.3 % (ref 0–0.9)
LYMPHOCYTES # BLD AUTO: 0.83 K/UL (ref 1–4.8)
LYMPHOCYTES NFR BLD: 7 % (ref 22–41)
MAGNESIUM SERPL-MCNC: 2.1 MG/DL (ref 1.5–2.5)
MCH RBC QN AUTO: 32.2 PG (ref 27–33)
MCHC RBC AUTO-ENTMCNC: 34.6 G/DL (ref 33.7–35.3)
MCV RBC AUTO: 93.1 FL (ref 81.4–97.8)
METHADONE UR QL SCN: NEGATIVE
MONOCYTES # BLD AUTO: 1.18 K/UL (ref 0–0.85)
MONOCYTES NFR BLD AUTO: 10 % (ref 0–13.4)
NEUTROPHILS # BLD AUTO: 9.75 K/UL (ref 1.82–7.42)
NEUTROPHILS NFR BLD: 82.5 % (ref 44–72)
NRBC # BLD AUTO: 0 K/UL
NRBC BLD-RTO: 0 /100 WBC
OPIATES UR QL SCN: NEGATIVE
OXYCODONE UR QL SCN: NEGATIVE
PCP UR QL SCN: NEGATIVE
PHOSPHATE SERPL-MCNC: 3.3 MG/DL (ref 2.5–4.5)
PLATELET # BLD AUTO: 104 K/UL (ref 164–446)
PMV BLD AUTO: 12.9 FL (ref 9–12.9)
POTASSIUM SERPL-SCNC: 3.5 MMOL/L (ref 3.6–5.5)
PROPOXYPH UR QL SCN: NEGATIVE
PROT SERPL-MCNC: 6.2 G/DL (ref 6–8.2)
RBC # BLD AUTO: 4.47 M/UL (ref 4.7–6.1)
SODIUM SERPL-SCNC: 137 MMOL/L (ref 135–145)
WBC # BLD AUTO: 11.8 K/UL (ref 4.8–10.8)

## 2018-07-21 PROCEDURE — 36415 COLL VENOUS BLD VENIPUNCTURE: CPT

## 2018-07-21 PROCEDURE — A9270 NON-COVERED ITEM OR SERVICE: HCPCS | Performed by: PSYCHIATRY & NEUROLOGY

## 2018-07-21 PROCEDURE — 80307 DRUG TEST PRSMV CHEM ANLYZR: CPT

## 2018-07-21 PROCEDURE — 99223 1ST HOSP IP/OBS HIGH 75: CPT | Mod: GC | Performed by: INTERNAL MEDICINE

## 2018-07-21 PROCEDURE — 80053 COMPREHEN METABOLIC PANEL: CPT

## 2018-07-21 PROCEDURE — 700102 HCHG RX REV CODE 250 W/ 637 OVERRIDE(OP): Performed by: STUDENT IN AN ORGANIZED HEALTH CARE EDUCATION/TRAINING PROGRAM

## 2018-07-21 PROCEDURE — 83735 ASSAY OF MAGNESIUM: CPT

## 2018-07-21 PROCEDURE — A9270 NON-COVERED ITEM OR SERVICE: HCPCS | Performed by: INTERNAL MEDICINE

## 2018-07-21 PROCEDURE — 84100 ASSAY OF PHOSPHORUS: CPT

## 2018-07-21 PROCEDURE — A9270 NON-COVERED ITEM OR SERVICE: HCPCS | Performed by: STUDENT IN AN ORGANIZED HEALTH CARE EDUCATION/TRAINING PROGRAM

## 2018-07-21 PROCEDURE — 770006 HCHG ROOM/CARE - MED/SURG/GYN SEMI*

## 2018-07-21 PROCEDURE — 700105 HCHG RX REV CODE 258: Performed by: STUDENT IN AN ORGANIZED HEALTH CARE EDUCATION/TRAINING PROGRAM

## 2018-07-21 PROCEDURE — 85025 COMPLETE CBC W/AUTO DIFF WBC: CPT

## 2018-07-21 PROCEDURE — 700102 HCHG RX REV CODE 250 W/ 637 OVERRIDE(OP): Performed by: INTERNAL MEDICINE

## 2018-07-21 PROCEDURE — 700102 HCHG RX REV CODE 250 W/ 637 OVERRIDE(OP): Performed by: PSYCHIATRY & NEUROLOGY

## 2018-07-21 RX ORDER — POTASSIUM CHLORIDE 20 MEQ/1
40 TABLET, EXTENDED RELEASE ORAL ONCE
Status: COMPLETED | OUTPATIENT
Start: 2018-07-21 | End: 2018-07-21

## 2018-07-21 RX ORDER — ZONISAMIDE 50 MG/1
100 CAPSULE ORAL DAILY
Status: DISCONTINUED | OUTPATIENT
Start: 2018-07-21 | End: 2018-07-22 | Stop reason: HOSPADM

## 2018-07-21 RX ADMIN — VALPROIC ACID 250 MG: 250 CAPSULE, LIQUID FILLED ORAL at 17:25

## 2018-07-21 RX ADMIN — VALPROIC ACID 250 MG: 250 CAPSULE, LIQUID FILLED ORAL at 05:23

## 2018-07-21 RX ADMIN — STANDARDIZED SENNA CONCENTRATE AND DOCUSATE SODIUM 2 TABLET: 8.6; 5 TABLET, FILM COATED ORAL at 05:23

## 2018-07-21 RX ADMIN — VALPROIC ACID 250 MG: 250 CAPSULE, LIQUID FILLED ORAL at 01:06

## 2018-07-21 RX ADMIN — ZONISAMIDE 100 MG: 50 CAPSULE ORAL at 17:39

## 2018-07-21 RX ADMIN — POTASSIUM CHLORIDE 40 MEQ: 1500 TABLET, EXTENDED RELEASE ORAL at 08:07

## 2018-07-21 RX ADMIN — SODIUM CHLORIDE: 9 INJECTION, SOLUTION INTRAVENOUS at 17:30

## 2018-07-21 RX ADMIN — VALPROIC ACID 250 MG: 250 CAPSULE, LIQUID FILLED ORAL at 12:17

## 2018-07-21 ASSESSMENT — ENCOUNTER SYMPTOMS
DIAPHORESIS: 0
NECK PAIN: 0
FEVER: 0
TINGLING: 0
HEARTBURN: 0
MYALGIAS: 1
NERVOUS/ANXIOUS: 0
SORE THROAT: 0
SENSORY CHANGE: 0
DOUBLE VISION: 0
HEADACHES: 0
NAUSEA: 0
ABDOMINAL PAIN: 0
PALPITATIONS: 0
BLURRED VISION: 0
VOMITING: 0
COUGH: 0
SHORTNESS OF BREATH: 0
MYALGIAS: 0
BLOOD IN STOOL: 0
CHILLS: 0
HEADACHES: 1
WHEEZING: 0
DEPRESSION: 0
BRUISES/BLEEDS EASILY: 0
FALLS: 0
DIZZINESS: 0

## 2018-07-21 ASSESSMENT — PATIENT HEALTH QUESTIONNAIRE - PHQ9
1. LITTLE INTEREST OR PLEASURE IN DOING THINGS: NOT AT ALL
SUM OF ALL RESPONSES TO PHQ9 QUESTIONS 1 AND 2: 0
2. FEELING DOWN, DEPRESSED, IRRITABLE, OR HOPELESS: NOT AT ALL

## 2018-07-21 ASSESSMENT — PAIN SCALES - GENERAL
PAINLEVEL_OUTOF10: 0
PAINLEVEL_OUTOF10: 1
PAINLEVEL_OUTOF10: 1
PAINLEVEL_OUTOF10: 0

## 2018-07-21 NOTE — ASSESSMENT & PLAN NOTE
Likely reactive secondary to seizure. Patient is afebrile with no signs of acute infection.  Resolved as of 7/22.

## 2018-07-21 NOTE — NON-PROVIDER
"       Internal Medicine Medical Student Note  Note Author: Ambreen Hickman, Student    Name Ezio Israel     1995   Age/Sex 23 y.o. male   MRN [id number]   Code Status FULL     Reason for interval visit  (Principal Problem)   Seizure disorder (HCC)    Interval Problem Daily Status Update  (problem status, last 24 hours, new history, new data )   Patient states he is feeling okay today, but frustrated and worried about his seizure condition.  He is open about previous illicit drug use (moly, speed, cocaine) in his late teens, states that he felt awful for \"days\" after use and denies any current use.  He states he currently smokes marijuana regularly, drinks 1-2 beers during the week and \"a lot on the weekends\".  ~10p last night he experienced a ~45 second seizure with diaphoresis, incontinence, and bilateral contraction/relaxation of his upper and lower extremities (generalized tonic-clonic).  He was given his first dose of valproic acid (250 mg) at ~8p.  He was given 2 mg lorazepam, the seizure resolved, and he experienced a post-ictal state.  Patient denies current blurry vision, dizziness, headache, nausea, vomiting.  He has some pain around the laceration behind his right ear (occipital/temporal).      Review of Systems   Constitutional: Negative for chills, diaphoresis and fever.   Eyes: Negative for blurred vision and double vision.   Respiratory: Negative for cough, shortness of breath and wheezing.    Cardiovascular: Negative for chest pain and palpitations.   Gastrointestinal: Negative for abdominal pain, heartburn, nausea and vomiting.   Musculoskeletal: Negative for myalgias.   Skin: Negative for itching and rash.   Neurological: Negative for dizziness and headaches.       Physical Exam       Vitals:    18 0000 18 0400 18 0800   BP: 130/75 123/75 118/66 125/68   Pulse: (!) 45 78 (!) 53 65   Resp:    Temp: 37.1 °C (98.7 °F) 37.2 °C (98.9 °F) 37.7 " "°C (99.9 °F) 37.6 °C (99.6 °F)   SpO2: 100% 98% 97% 98%   Weight:         Body mass index is 21.31 kg/m².    Oxygen Therapy:  Pulse Oximetry: 98 %, O2 (LPM): 2, O2 Delivery: None (Room Air)    Physical Exam   Constitutional: He is oriented to person, place, and time and well-developed, well-nourished, and in no distress. No distress.   HENT:   Head: Normocephalic.   RIGHT laceration temporal/occipital- sutured in ED.  Some serous discharge today on pillow.   Eyes: EOM are normal.   Cardiovascular: Normal rate, regular rhythm and normal heart sounds.  Exam reveals no gallop and no friction rub.    No murmur heard.  Pulmonary/Chest: Effort normal. He has wheezes.   Abdominal: Soft. He exhibits no distension. There is no tenderness. There is no guarding.   Neurological: He is alert and oriented to person, place, and time.   Skin: He is not diaphoretic.   Psychiatric: Mood and affect normal.     Pertinent Labs/Imaging:  wbc's 11.8 today (down from 14.2 yesterday)  anion gap 12 (down from 20 yesterday)    MRI (7/20): Conclusions per Dr. Jiang  \"1.  3 areas of LEFT frontal and temporal hemorrhagic contusion without discernible mass effect or associated enhancement  2.  No evidence of gray matter heterotopia, gross cortical dysplasia or mesial temporal sclerosis  3.  In light of the patient's reported long-standing history of seizure disorder, follow-up MRI without and with contrast in 3 months is recommended\"      Assessment/Plan     1. Seizure disorder: chronic/stable  Currently not suspecting seizures secondary to alcohol withdrawal or secondary to drug toxicity or overdose, although they should not be ruled out.  Seizures have been observed several times (coworkers, grandparents) but it is currently unknown if they were provoked vs. unprovoked.  -urine drug screen pending (ordered 7/20/2018)  -video electroencephalogram planned for Monday (7/23/2018)  -continue valproic acid 250mg Q6HRS  -monitor: electrolytes, BUN, " creatinine, glucose, calcium, magnesium, LFT's  -neurology to be consulted  -patient needs established care with neurology outpatient for management  -seizure precautions  -lorazepam as needed for seizure control    2. Acidosis: acute/improved  -CMP Q24 hrs to follow up  -urine drug screen pending     3. Leukocytosis: acute/improved  -repeat CBC with differential Q24 hrs until resolved  -monitor for possible infection (99.9 F temp last night, could be secondary to seizure)    Ambreen Hickman

## 2018-07-21 NOTE — SENIOR ADMIT NOTE
Senior Admit Note    Chief complaint: Seizures    History of present illness: In brief, this a very pleasant 23-year-old female with past medical history significant only for seizures presents to the ER after he had a seizure earlier this morning.     Earlier while at work, he reportedly had a which was generalized tonic-clonic.  It was asked by his colleagues.  Recovered after that, but suffered a head injury at that time.  He was seen in the ER, at which time he had no neurological abnormalities.  Laceration his head was repaired and he was discharged home.  However, after his discharge in the parking lot he had another seizure following which he came back to the ER.  This time, he had some confusion initially before he regained consciousness.    Off note, he does not have a neurologist following as an outpatient.  He was reportedly placed on Keppra a few months ago, after which his reportedly became worse.  It has been a few years since tge onset of seizures, and is really trying a few years ago and states that he usually gets seizures in clusters about 1 or 2 times every few months.    Physical exam:  Constitutional: He is lethargic, appears comfortable  HEENT: Laceration found on the back of his head.  No longer actively bleeding.  Respiratory: Clear to auscultation bilaterally  Cardiovascular: Regular rate and rhythm, no murmurs rubs or gallops  Abdomen: Soft, nontender, normal bowel sounds  Neurological: Oriented ×3, somewhat lethargic, no focal deficits, no cranial nerve deficits    Impression: This a 23-year-old male presenting with seizures.  He reports Keppra to have made his seizures worse, and therefore is being started on valproic acid at this moment.  We will get neurology consult for further determination of meds.  MRI is also ordered.  EEG is pending at this time.    Assessment:  #1 seizures, likely generalized tonic-clonic  #2 increased anion gap metabolic acidosis  #3 leukocytosis    Plan:  #1  start valproic acid  #2 every 4 hours neuro checks  #3 MRI of the brain  #4  EEG  #5 neurology consult of

## 2018-07-21 NOTE — PROGRESS NOTES
Pt is A&Ox4 c/o of slight HA rated it 1/10 denies need for medication. Denies any other needs at this time. Seizure precautions in place. Bed alarm & strip alarm on. Call light within reach. Hourly rounding in place

## 2018-07-21 NOTE — PROGRESS NOTES
Pt. back to baseline and alert and oriented x 4 at this time but remains lethargic. Pt. complaining of nausea. Dr. Arcadio byrnes. MD updated about seizure episode and received orders for 4 mg oral Zofran x 1 for nausea.

## 2018-07-21 NOTE — ASSESSMENT & PLAN NOTE
The patient has history of seizures that per patient began when he was 20 years old. Last seizure prior to today was in May 2018.   Likely generalized, tonic-clonic.  States that he has periods with multiple seizures followed by months with no seizures.  He does not take any medication for seizures at home or follow up with a neurologist or PCP.  One seizure this morning and subsequently another this afternoon.  Reports that he has taken Keppra before but states that they increased his seizures.  MRI reveals 3 areas of left frontal and temporal hemorrhagic contusions.  Plan:  -Seizure precautions, Fall Precautions, Aspiration Precautions.  -Continue Zonisamide 100 mg and titrate up to 300 mg daily per neurology recommendations.  -Continue Depakote while inpatient, then discontinue.  -Follow up at neurology clinic.  -Q4 hour neuro checks.  -Ativan PRN for breakthrough seizures.

## 2018-07-21 NOTE — ASSESSMENT & PLAN NOTE
On admission, patient presented with an anion gap of 20 with a bicarb of 14. Likely metabolic acidosis. DDX could include alcohol use disorder vs. Lactic acidosis secondary to seizure.  Lactic acid 0.9.  Alcohol 0.01.  Urine drug screen pos. For cannabanoids only.  Resolved.

## 2018-07-21 NOTE — H&P
Internal Medicine Admitting History and Physical    Note Author: Mauro Jack D.O.       Name Ezio Israel     1995   Age/Sex 23 y.o. male   MRN [id number]   Code Status FULL     After 5PM or if no immediate response to page, please call for cross-coverage  Attending/Team: Odilon/Ananda See Patient List for primary contact information  Call [phone] to page    1st Call - Day Intern (R1):   Dr. Jack 2nd Call - Day Sr. Resident (R2/R3):   Dr. Morel       Chief Complaint:   Seizures    HPI:  The patient is a pleasant 24 y/o male with a PMH of seizure disorder since his early twenties who is not taking any medication for seizures at home. The patient does see a neurologist and does not see a PCP regularly. The patient states that this morning while he was working at his job at a [employer] he had a witnessed seizure. No known provoking factors. The patient recalls only that he smelled a distinct rubber/plastic smell before having the seizure. He does not recall the events of the seizure itself, only that when he awoke he was being transported in an ambulance to the hospital. At the ED this morning, the patient had a head laceration that was repaired and a CT head which was negative. He was subsequently discharged. As his grandfather was driving him home, the patient had another seizure and hit his head. He states that this seizure also was accompanied by an aura of a rubber smell.     The patient denies any nausea or vomiting prior to the seizure. Denies any shortness of breath or changes in his vision including photophobia prior to his seizure. In the patient's post-ictal state both times he reports muscle aches, weakness, numbness and tingling in his upper and lower extremities bilaterally, and blurry vision. The patient reports that he lost control of his bladder during his second seizure today. Denies biting his tongue.    In the ED this afternoon, labs were drawn and the patient  was found to have an acidosis with a Anion gap of 20. The patient was started on IV fluids, and an MRI and EEG were ordered.    Review of Systems   Constitutional: Negative for chills and fever.   HENT: Negative for ear pain, hearing loss and tinnitus.    Eyes: Positive for blurred vision. Negative for double vision, photophobia and pain.   Respiratory: Negative for cough and shortness of breath.    Cardiovascular: Negative for chest pain, palpitations and leg swelling.   Gastrointestinal: Negative for abdominal pain, constipation, diarrhea, heartburn, nausea and vomiting.   Genitourinary: Negative for dysuria and urgency.   Musculoskeletal: Negative for myalgias and neck pain.   Skin: Negative for itching and rash.   Neurological: Positive for dizziness and headaches.   Endo/Heme/Allergies: Does not bruise/bleed easily.   Psychiatric/Behavioral: Negative for depression and hallucinations. The patient is not nervous/anxious.              Past Medical History (Chronic medical problem, known complications and current treatment)    Seizures, does not take any medications for seizures at home. Does not follow up with a neurologist or PCP.    Past Surgical History:  Past Surgical History:   Procedure Laterality Date   • WRIST FUSION Left     Patient reports left wrist surgery at age 2       Current Outpatient Medications:  Home Medications     Reviewed by Analia Singletary (Pharmacy Tech) on 07/20/18 at 1549  Med List Status: Complete   Medication Last Dose Status        Patient Ronni Taking any Medications                       Medication Allergy/Sensitivities:  Allergies   Allergen Reactions   • Augmentin    • Lortab [Hydrocodone-Acetaminophen] Rash         Family History (mandatory)   Family History   Problem Relation Age of Onset   • Stroke Mother 56   • Seizures Neg Hx    • Cancer Neg Hx    • Diabetes Neg Hx        Social History (mandatory)   Social History     Social History   • Marital status: Single      Spouse name: N/A   • Number of children: N/A   • Years of education: N/A     Occupational History   • Not on file.     Social History Main Topics   • Smoking status: Current Every Day Smoker     Packs/day: 0.50   • Smokeless tobacco: Never Used   • Alcohol use Yes      Comment: occ   • Drug use: Yes     Types: Inhaled      Comment: marijuana    • Sexual activity: Not on file     Other Topics Concern   • Not on file     Social History Narrative   • No narrative on file     Living situation: Lives alone  PCP : Pcp Pt States None    Physical Exam     Vitals:    07/20/18 1620 07/20/18 1635 07/20/18 1645 07/20/18 1650   BP:       Pulse: 68 (!) 53 (!) 55 (!) 52   Resp:       Temp:       SpO2: 97% (!) 83% 92% 93%   Weight:         Body mass index is 21.31 kg/m².  /83   Pulse (!) 52   Temp 36.6 °C (97.9 °F)   Resp 18   Wt 79.4 kg (175 lb 0.7 oz)   SpO2 93%   BMI 21.31 kg/m²   O2 therapy: Pulse Oximetry: 93 %, O2 Delivery: None (Room Air)    Physical Exam   Constitutional: He is oriented to person, place, and time and well-developed, well-nourished, and in no distress.   Somnolent in post-ictal state   HENT:   Right Ear: External ear normal.   Cut on right posterior aspect of patient's head with dried blood behind patient's ear.   Eyes: Conjunctivae are normal. Pupils are equal, round, and reactive to light. Right eye exhibits no discharge. Left eye exhibits no discharge. No scleral icterus.   Neck: Neck supple. No thyromegaly present.   Cardiovascular: Normal rate and regular rhythm.  Exam reveals no gallop and no friction rub.    No murmur heard.  Pulmonary/Chest: Breath sounds normal. No respiratory distress. He has no wheezes.   Abdominal: Soft. Bowel sounds are normal. He exhibits no distension. There is no tenderness. There is no rebound.   Musculoskeletal: Normal range of motion. He exhibits no edema or deformity.   Neurological: He is alert and oriented to person, place, and time. He has normal reflexes.  No cranial nerve deficit.   Skin: Skin is dry. No rash noted. No erythema.   Psychiatric: Mood, affect and judgment normal.         Data Review       Old Records Request:   Completed  Current Records review/summary: Completed    Lab Data Review:  Recent Results (from the past 24 hour(s))   CBC WITH DIFFERENTIAL    Collection Time: 07/20/18  2:20 PM   Result Value Ref Range    WBC 14.2 (H) 4.8 - 10.8 K/uL    RBC 5.08 4.70 - 6.10 M/uL    Hemoglobin 16.5 14.0 - 18.0 g/dL    Hematocrit 47.2 42.0 - 52.0 %    MCV 92.9 81.4 - 97.8 fL    MCH 32.5 27.0 - 33.0 pg    MCHC 35.0 33.7 - 35.3 g/dL    RDW 43.6 35.9 - 50.0 fL    Platelet Count 132 (L) 164 - 446 K/uL    MPV 12.8 9.0 - 12.9 fL    Neutrophils-Polys 79.00 (H) 44.00 - 72.00 %    Lymphocytes 12.90 (L) 22.00 - 41.00 %    Monocytes 7.30 0.00 - 13.40 %    Eosinophils 0.10 0.00 - 6.90 %    Basophils 0.30 0.00 - 1.80 %    Immature Granulocytes 0.40 0.00 - 0.90 %    Nucleated RBC 0.00 /100 WBC    Neutrophils (Absolute) 11.18 (H) 1.82 - 7.42 K/uL    Lymphs (Absolute) 1.83 1.00 - 4.80 K/uL    Monos (Absolute) 1.03 (H) 0.00 - 0.85 K/uL    Eos (Absolute) 0.01 0.00 - 0.51 K/uL    Baso (Absolute) 0.04 0.00 - 0.12 K/uL    Immature Granulocytes (abs) 0.06 0.00 - 0.11 K/uL    NRBC (Absolute) 0.00 K/uL   COMP METABOLIC PANEL    Collection Time: 07/20/18  2:20 PM   Result Value Ref Range    Sodium 139 135 - 145 mmol/L    Potassium 3.3 (L) 3.6 - 5.5 mmol/L    Chloride 105 96 - 112 mmol/L    Co2 14 (L) 20 - 33 mmol/L    Anion Gap 20.0 (H) 0.0 - 11.9    Glucose 138 (H) 65 - 99 mg/dL    Bun 8 8 - 22 mg/dL    Creatinine 0.99 0.50 - 1.40 mg/dL    Calcium 9.3 8.5 - 10.5 mg/dL    AST(SGOT) 27 12 - 45 U/L    ALT(SGPT) 15 2 - 50 U/L    Alkaline Phosphatase 83 30 - 99 U/L    Total Bilirubin 0.9 0.1 - 1.5 mg/dL    Albumin 4.7 3.2 - 4.9 g/dL    Total Protein 7.3 6.0 - 8.2 g/dL    Globulin 2.6 1.9 - 3.5 g/dL    A-G Ratio 1.8 g/dL   PROTHROMBIN TIME    Collection Time: 07/20/18  2:20 PM   Result Value  Ref Range    PT 13.8 12.0 - 14.6 sec    INR 1.09 0.87 - 1.13   APTT    Collection Time: 07/20/18  2:20 PM   Result Value Ref Range    APTT 25.4 24.7 - 36.0 sec   ESTIMATED GFR    Collection Time: 07/20/18  2:20 PM   Result Value Ref Range    GFR If African American >60 >60 mL/min/1.73 m 2    GFR If Non African American >60 >60 mL/min/1.73 m 2       Imaging/Procedures Review:    Independant Imaging Review: Completed  MR-BRAIN-WITH & W/O   Final Result      1.  3 areas of LEFT frontal and temporal hemorrhagic contusion without discernible mass effect or associated enhancement   2.  No evidence of gray matter heterotopia, gross cortical dysplasia or mesial temporal sclerosis   3.  In light of the patient's reported long-standing history of seizure disorder, follow-up MRI without and with contrast in 3 months is recommended      Findings were discussed with FRANSISCO MONTESINOS on 7/20/2018 5:55 PM.      CT-HEAD W/O   Final Result      No intracranial mass effect or acute hemorrhage.        Records reviewed and summarized in current documentation :  Yes  [employer] teaching service handout given to patient:  No         Assessment/Plan     * Seizure disorder (HCC)- (present on admission)   Assessment & Plan    The patient has history of seizures that per patient began when he was 20 years old. Last seizure prior to today was in May 2018.   Likely generalized, tonic-clonic.  States that he has periods with multiple seizures followed by months with no seizures.  He does not take any medication for seizures at home or follow up with a neurologist or PCP.  One seizure this morning and subsequently another this afternoon.  Reports that he has taken Keppra before but states that they increased his seizures.  MRI reveals 3 areas of left frontal and temporal hemorrhagic contusions.  Plan:  -Seizure precautions, Fall Precautions, Aspiration Precautions.  -EEG  -Neuro consult.  -Q4 hour neuro checks.  -Valproic Acid        Leukocytosis-  (present on admission)   Assessment & Plan    Likely reactive secondary to seizure. Patient is afebrile with no signs of acute infection.  Plan:  -CBC in AM.  -Trend WBC.          Acidosis- (present on admission)   Assessment & Plan    Patient presented with an anion gap of 20 with a bicarb of 14. Likely metabolic acidosis. DDX could include alcohol use disorder vs. Lactic acidosis secondary to seizure.  Lactic acid 0.9.  Alcohol 0.01.  Plan:  -Urine Drug Screen            Anticipated Hospital stay:  >2 midnights        Quality Measures  Quality-Core Measures   Reviewed items::  Labs reviewed, Medications reviewed and Radiology images reviewed  Trivedi catheter::  No Trivedi  DVT prophylaxis - mechanical:  SCDs    PCP: Pcp Pt States None    Mauro Jack DO

## 2018-07-21 NOTE — PROGRESS NOTES
Pt. had 45 seconds of tonic clonic seizure with uncontrolled sporadic movement of upper and lower extremities. During the episode, pt. had increased secretions, became diaphoretic and was incontinent of urine. Pt. was unable to answer any orientation questions or follow any commands. 2 mg IV ativan was given. Pt. currently post-ictal. Opens eyes and responding with some incoherent words but otherwise remains somnolent.

## 2018-07-21 NOTE — CARE PLAN
Problem: Knowledge Deficit  Goal: Knowledge of disease process/condition, treatment plan, diagnostic tests, and medications will improve  Outcome: PROGRESSING AS EXPECTED  Pt. educated about the plan of care and medication regimen. All questions and concerns addressed at this time.     Problem: Medication  Goal: Compliance with prescribed medication will improve  Outcome: PROGRESSING AS EXPECTED  Pt. Compliant with current medications regimen.

## 2018-07-21 NOTE — PROGRESS NOTES
Internal Medicine Interval Note  Note Author: Mauro Jack D.O.     Name Ezio Israel     1995   Age/Sex 23 y.o. male   MRN [id number]   Code Status FULL     After 5PM or if no immediate response to page, please call for cross-coverage  Attending/Team: Odilon/Ananda See Patient List for primary contact information  Call [phone] to page    1st Call - Day Intern (R1):   Dr. Jack 2nd Call - Day Sr. Resident (R2/R3):   Dr. Morel         Reason for interval visit  (Principal Problem)       Interval Problem Daily Status Update  (24 hours, problem oriented, brief subjective history, new lab/imaging data pertinent to that problem)   The patient had one seizure overnight while on valproic acid that was tonic-clonic and lasted 45 seconds.    The patient was given Ativan 2mg and the seizure resolved. He did not hit his head or bite his tongue.    Patient has a Video EEG pending as well as a neurology consult.    Review of Systems   Constitutional: Negative for chills and fever.   HENT: Negative for hearing loss, sore throat and tinnitus.    Eyes: Negative for blurred vision and double vision.   Respiratory: Negative for cough and wheezing.    Cardiovascular: Negative for chest pain and palpitations.   Gastrointestinal: Negative for abdominal pain, blood in stool, heartburn, nausea and vomiting.   Musculoskeletal: Positive for myalgias. Negative for falls and neck pain.   Skin: Negative for itching and rash.   Neurological: Positive for headaches. Negative for dizziness, tingling and sensory change.        Headache in the forehead and temporal lobes following seizure last night.   Endo/Heme/Allergies: Does not bruise/bleed easily.   Psychiatric/Behavioral: Negative for depression. The patient is not nervous/anxious.        Disposition/Barriers to discharge:   Patient will need Video EEG and neurology consult prior to discharge. MRI already performed.      Consultants/Specialty  Neurology  PCP: Pcp Pt States None      Quality Measures  Quality-Core Measures   Reviewed items::  Labs reviewed, Medications reviewed, EKG reviewed and Radiology images reviewed  Trivedi catheter::  No Trivedi  DVT prophylaxis - mechanical:  SCDs          Physical Exam       Vitals:    07/20/18 2000 07/21/18 0000 07/21/18 0400 07/21/18 0800   BP: 130/75 123/75 118/66 125/68   Pulse: (!) 45 78 (!) 53 65   Resp: 18 18 18 18   Temp: 37.1 °C (98.7 °F) 37.2 °C (98.9 °F) 37.7 °C (99.9 °F) 37.6 °C (99.6 °F)   SpO2: 100% 98% 97% 98%   Weight:         Body mass index is 21.31 kg/m². Weight: 79.4 kg (175 lb 0.7 oz)  Oxygen Therapy:  Pulse Oximetry: 98 %, O2 (LPM): 2, O2 Delivery: None (Room Air)    Physical Exam   Constitutional: He is oriented to person, place, and time and well-developed, well-nourished, and in no distress. No distress.   HENT:   Right Ear: External ear normal.   Left Ear: External ear normal.   Mouth/Throat: Oropharynx is clear and moist.   Closed laceration on right posterior head with staples in place.   Eyes: Conjunctivae and EOM are normal. Pupils are equal, round, and reactive to light. Right eye exhibits no discharge. Left eye exhibits no discharge. No scleral icterus.   Neck: Neck supple. No JVD present.   Cardiovascular: Normal rate, regular rhythm and normal heart sounds.    Pulmonary/Chest: Effort normal and breath sounds normal. No stridor. He has no rales. He exhibits no tenderness.   Abdominal: Soft. Bowel sounds are normal. He exhibits no mass. There is no tenderness. There is no guarding.   Musculoskeletal: He exhibits no edema or tenderness.   Neurological: He is alert and oriented to person, place, and time. He has normal reflexes. No cranial nerve deficit. Coordination normal.   Skin: Skin is dry. No rash noted. He is not diaphoretic. No erythema. There is pallor.   Psychiatric: Mood, affect and judgment normal.             Assessment/Plan     * Seizure disorder  (HCC)- (present on admission)   Assessment & Plan    The patient has history of seizures that per patient began when he was 20 years old. Last seizure prior to today was in May 2018.   Likely generalized, tonic-clonic.  States that he has periods with multiple seizures followed by months with no seizures.  He does not take any medication for seizures at home or follow up with a neurologist or PCP.  One seizure this morning and subsequently another this afternoon.  Reports that he has taken Keppra before but states that they increased his seizures.  MRI reveals 3 areas of left frontal and temporal hemorrhagic contusions.  Video EEG ordered.  Plan:  -Seizure precautions, Fall Precautions, Aspiration Precautions.  -Neuro consult  -Q4 hour neuro checks  -Valproic Acid  -Ativan PRN for breakthrough seizures        Leukocytosis- (present on admission)   Assessment & Plan    Likely reactive secondary to seizure. Patient is afebrile with no signs of acute infection.  Resolving. White count down to 11.8 as of 7/21.  Plan:  -CBC w/ diff. in morning.            Acidosis- (present on admission)   Assessment & Plan    On admission, patient presented with an anion gap of 20 with a bicarb of 14. Likely metabolic acidosis. DDX could include alcohol use disorder vs. Lactic acidosis secondary to seizure.  Anion Gap 7/21 is 12.  Lactic acid 0.9.  Alcohol 0.01.  Urine drug screen ordered 7/20  Resolving.

## 2018-07-22 VITALS
OXYGEN SATURATION: 100 % | TEMPERATURE: 99.4 F | SYSTOLIC BLOOD PRESSURE: 121 MMHG | HEART RATE: 55 BPM | BODY MASS INDEX: 21.31 KG/M2 | WEIGHT: 175.04 LBS | DIASTOLIC BLOOD PRESSURE: 64 MMHG | RESPIRATION RATE: 16 BRPM

## 2018-07-22 PROBLEM — E87.6 HYPOKALEMIA: Status: ACTIVE | Noted: 2018-07-22

## 2018-07-22 PROBLEM — E87.20 ACIDOSIS: Status: RESOLVED | Noted: 2018-07-20 | Resolved: 2018-07-22

## 2018-07-22 PROBLEM — E87.6 HYPOKALEMIA: Status: RESOLVED | Noted: 2018-07-22 | Resolved: 2018-07-22

## 2018-07-22 PROBLEM — D72.829 LEUKOCYTOSIS: Status: RESOLVED | Noted: 2018-07-20 | Resolved: 2018-07-22

## 2018-07-22 LAB
ALBUMIN SERPL BCP-MCNC: 4 G/DL (ref 3.2–4.9)
ALBUMIN/GLOB SERPL: 1.6 G/DL
ALP SERPL-CCNC: 60 U/L (ref 30–99)
ALT SERPL-CCNC: 13 U/L (ref 2–50)
ANION GAP SERPL CALC-SCNC: 9 MMOL/L (ref 0–11.9)
AST SERPL-CCNC: 21 U/L (ref 12–45)
BASOPHILS # BLD AUTO: 0.3 % (ref 0–1.8)
BASOPHILS # BLD: 0.02 K/UL (ref 0–0.12)
BILIRUB SERPL-MCNC: 0.9 MG/DL (ref 0.1–1.5)
BUN SERPL-MCNC: 4 MG/DL (ref 8–22)
CALCIUM SERPL-MCNC: 8.7 MG/DL (ref 8.5–10.5)
CHLORIDE SERPL-SCNC: 110 MMOL/L (ref 96–112)
CO2 SERPL-SCNC: 22 MMOL/L (ref 20–33)
CREAT SERPL-MCNC: 0.9 MG/DL (ref 0.5–1.4)
EOSINOPHIL # BLD AUTO: 0.01 K/UL (ref 0–0.51)
EOSINOPHIL NFR BLD: 0.1 % (ref 0–6.9)
ERYTHROCYTE [DISTWIDTH] IN BLOOD BY AUTOMATED COUNT: 42.7 FL (ref 35.9–50)
GLOBULIN SER CALC-MCNC: 2.5 G/DL (ref 1.9–3.5)
GLUCOSE SERPL-MCNC: 87 MG/DL (ref 65–99)
HCT VFR BLD AUTO: 41.5 % (ref 42–52)
HGB BLD-MCNC: 14.4 G/DL (ref 14–18)
IMM GRANULOCYTES # BLD AUTO: 0.01 K/UL (ref 0–0.11)
IMM GRANULOCYTES NFR BLD AUTO: 0.1 % (ref 0–0.9)
LYMPHOCYTES # BLD AUTO: 1.38 K/UL (ref 1–4.8)
LYMPHOCYTES NFR BLD: 20.1 % (ref 22–41)
MAGNESIUM SERPL-MCNC: 2.1 MG/DL (ref 1.5–2.5)
MCH RBC QN AUTO: 32 PG (ref 27–33)
MCHC RBC AUTO-ENTMCNC: 34.7 G/DL (ref 33.7–35.3)
MCV RBC AUTO: 92.2 FL (ref 81.4–97.8)
MONOCYTES # BLD AUTO: 0.85 K/UL (ref 0–0.85)
MONOCYTES NFR BLD AUTO: 12.4 % (ref 0–13.4)
NEUTROPHILS # BLD AUTO: 4.61 K/UL (ref 1.82–7.42)
NEUTROPHILS NFR BLD: 67 % (ref 44–72)
NRBC # BLD AUTO: 0 K/UL
NRBC BLD-RTO: 0 /100 WBC
PLATELET # BLD AUTO: 99 K/UL (ref 164–446)
PMV BLD AUTO: 12.9 FL (ref 9–12.9)
POTASSIUM SERPL-SCNC: 3.3 MMOL/L (ref 3.6–5.5)
PROT SERPL-MCNC: 6.5 G/DL (ref 6–8.2)
RBC # BLD AUTO: 4.5 M/UL (ref 4.7–6.1)
SODIUM SERPL-SCNC: 141 MMOL/L (ref 135–145)
WBC # BLD AUTO: 6.9 K/UL (ref 4.8–10.8)

## 2018-07-22 PROCEDURE — 700102 HCHG RX REV CODE 250 W/ 637 OVERRIDE(OP): Performed by: INTERNAL MEDICINE

## 2018-07-22 PROCEDURE — 85025 COMPLETE CBC W/AUTO DIFF WBC: CPT

## 2018-07-22 PROCEDURE — A9270 NON-COVERED ITEM OR SERVICE: HCPCS | Performed by: PSYCHIATRY & NEUROLOGY

## 2018-07-22 PROCEDURE — A9270 NON-COVERED ITEM OR SERVICE: HCPCS | Performed by: STUDENT IN AN ORGANIZED HEALTH CARE EDUCATION/TRAINING PROGRAM

## 2018-07-22 PROCEDURE — 80053 COMPREHEN METABOLIC PANEL: CPT

## 2018-07-22 PROCEDURE — A9270 NON-COVERED ITEM OR SERVICE: HCPCS | Performed by: INTERNAL MEDICINE

## 2018-07-22 PROCEDURE — 700102 HCHG RX REV CODE 250 W/ 637 OVERRIDE(OP): Performed by: PSYCHIATRY & NEUROLOGY

## 2018-07-22 PROCEDURE — 36415 COLL VENOUS BLD VENIPUNCTURE: CPT

## 2018-07-22 PROCEDURE — 83735 ASSAY OF MAGNESIUM: CPT

## 2018-07-22 PROCEDURE — 700102 HCHG RX REV CODE 250 W/ 637 OVERRIDE(OP): Performed by: STUDENT IN AN ORGANIZED HEALTH CARE EDUCATION/TRAINING PROGRAM

## 2018-07-22 PROCEDURE — 99238 HOSP IP/OBS DSCHRG MGMT 30/<: CPT | Mod: GC | Performed by: INTERNAL MEDICINE

## 2018-07-22 RX ORDER — ZONISAMIDE 100 MG/1
CAPSULE ORAL
Qty: 100 CAP | Refills: 0 | Status: SHIPPED | OUTPATIENT
Start: 2018-07-22 | End: 2018-08-08

## 2018-07-22 RX ORDER — POTASSIUM CHLORIDE 20 MEQ/1
40 TABLET, EXTENDED RELEASE ORAL ONCE
Status: COMPLETED | OUTPATIENT
Start: 2018-07-22 | End: 2018-07-22

## 2018-07-22 RX ADMIN — STANDARDIZED SENNA CONCENTRATE AND DOCUSATE SODIUM 2 TABLET: 8.6; 5 TABLET, FILM COATED ORAL at 05:48

## 2018-07-22 RX ADMIN — VALPROIC ACID 250 MG: 250 CAPSULE, LIQUID FILLED ORAL at 12:10

## 2018-07-22 RX ADMIN — VALPROIC ACID 250 MG: 250 CAPSULE, LIQUID FILLED ORAL at 05:47

## 2018-07-22 RX ADMIN — POTASSIUM CHLORIDE 40 MEQ: 1500 TABLET, EXTENDED RELEASE ORAL at 08:45

## 2018-07-22 RX ADMIN — VALPROIC ACID 250 MG: 250 CAPSULE, LIQUID FILLED ORAL at 00:32

## 2018-07-22 RX ADMIN — ZONISAMIDE 100 MG: 50 CAPSULE ORAL at 05:47

## 2018-07-22 ASSESSMENT — ENCOUNTER SYMPTOMS
BRUISES/BLEEDS EASILY: 0
DIZZINESS: 0
DEPRESSION: 0
NERVOUS/ANXIOUS: 0
COUGH: 0
NAUSEA: 0
BLURRED VISION: 0
INSOMNIA: 0
VOMITING: 0
MYALGIAS: 0
FALLS: 0
FEVER: 0
PHOTOPHOBIA: 0
TINGLING: 1
NECK PAIN: 0
DOUBLE VISION: 0
HEADACHES: 0
CONSTIPATION: 0
SHORTNESS OF BREATH: 0
CHILLS: 0
ABDOMINAL PAIN: 0
PALPITATIONS: 0
SENSORY CHANGE: 0
DIARRHEA: 0

## 2018-07-22 NOTE — DISCHARGE INSTRUCTIONS
Discharge Instructions    Discharged to home by car with friend. Discharged via walking, hospital escort: Yes.  Special equipment needed: Not Applicable    Be sure to schedule a follow-up appointment with your primary care doctor or any specialists as instructed.     Discharge Plan:   Diet Plan: Discussed  Activity Level: Discussed  Confirmed Follow up Appointment: Patient to Call and Schedule Appointment  Confirmed Symptoms Management: Discussed  Medication Reconciliation Updated: Yes  Influenza Vaccine Indication: Patient Refuses    I understand that a diet low in cholesterol, fat, and sodium is recommended for good health. Unless I have been given specific instructions below for another diet, I accept this instruction as my diet prescription.   Other diet: Regular    Special Instructions: None    · Is patient discharged on Warfarin / Coumadin?   No   Zonisamide capsules  What is this medicine?  ZONISAMIDE (jacquelin NIS a mide) is used to control partial seizures in adults with epilepsy.  This medicine may be used for other purposes; ask your health care provider or pharmacist if you have questions.  COMMON BRAND NAME(S): Kurtisaide  What should I tell my health care provider before I take this medicine?  They need to know if you have any of these conditions:  -dehydrated  -diarrhea  -history of metabolic acidosis (too much acid in your blood)  -ketogenic diet  -kidney disease  -liver disease  -lung disease  -osteoporosis  -suicidal thoughts, plans, or attempt; a previous suicide attempt by you or a family member  -an unusual or allergic reaction to zonisamide, sulfa drugs, other medicines, foods, dyes, or preservatives  -pregnant or trying to get pregnant  -breast-feeding  How should I use this medicine?  Take this medicine by mouth with a glass of water. Follow the directions on the prescription label. Swallow whole. Do not break open the capsule. This medicine may be taken with or without food. Take your doses at  regular intervals. Do not take your medicine more often than directed. Do not stop taking this medicine unless instructed by your doctor or health care professional.  A special MedGuide will be given to you by the pharmacist with each prescription and refill. Be sure to read this information carefully each time.  Talk to your pediatrician regarding the use of this medicine in children. While this drug may be prescribed for children as young as 16 years of age for selected conditions, precautions do apply.  Overdosage: If you think you have taken too much of this medicine contact a poison control center or emergency room at once.  NOTE: This medicine is only for you. Do not share this medicine with others.  What if I miss a dose?  If you miss a dose, take it as soon as you can. If it is almost time for your next dose, take only that dose. Do not take double or extra doses.  What may interact with this medicine?  This medicine may interact with the following medications  -alcohol  -antihistamines for allergy, cough and cold  -antiviral medicines for HIV or AIDS  -certain antibiotics like rifabutin, rifampin  -certain medicines for anxiety or sleep  -certain medicines for depression like amitriptyline, fluoxetine, sertraline  -certain medicines for seizures like carbamazepine, phenobarbital, phenytoin, topiramate  -digoxin  -diuretics like acetazolamide, dichlorphenamide  -general anesthetics like halothane, isoflurane, methoxyflurane, propofol  -local anesthetics like lidocaine, pramoxine, tetracaine  -medicines that relax muscles for surgery  -narcotic medicines for pain  -phenothiazines like chlorpromazine, mesoridazine, prochlorperazine, thioridazine  -quinidine  This list may not describe all possible interactions. Give your health care provider a list of all the medicines, herbs, non-prescription drugs, or dietary supplements you use. Also tell them if you smoke, drink alcohol, or use illegal drugs. Some items  may interact with your medicine.  What should I watch for while using this medicine?  Visit your doctor or health care professional for regular checks on your progress. Wear a medical identification bracelet or chain to say you have epilepsy, and carry a card that lists all your medications.  It is important to take this medicine exactly as directed. When first starting treatment, your dose will need to be adjusted slowly. It may take weeks or months before your dose is stable. You should contact your doctor or health care professional if your seizures get worse or if you have any new types of seizures. Do not stop taking except on your doctor's advice. You may develop a severe reaction. Your doctor will tell you how much medicine to take.  You may get drowsy, dizzy, or have blurred vision. Do not drive, use machinery, or do anything that needs mental alertness until you know how this medicine affects you. To reduce dizzy or fainting spells, do not sit or stand up quickly, especially if you are an older patient. Alcohol can increase drowsiness and dizziness. Avoid alcoholic drinks.  Avoid extreme heat. This medicine can cause you to sweat less than normal. Your body temperature could increase to dangerous levels, which may lead to heat stroke.  This medicine may increase the chance of developing metabolic acidosis. If left untreated, this can cause kidney stones, bone disease, or slowed growth in children. Symptoms include breathing fast, fatigue, loss of appetite, irregular heartbeat, or loss of consciousness. Call your doctor immediately if you experience any of these side effects. Also, tell your doctor about any surgery you plan on having while taking this medicine since this may increase your risk for metabolic acidosis.  This medicines may increase the risk of kidney stones. Drinking 6 to 8 glasses of water a day may help prevent the formation of kidney stones.  The use of this medicine may increase the chance  of suicidal thoughts or actions. Pay special attention to how you are responding while on this medicine. Any worsening of mood, or thoughts of suicide or dying should be reported to your health care professional right away.  Women who become pregnant while using this medicine may enroll in the [place] Antiepileptic Drug Pregnancy Registry by calling 1-193.226.6146. This registry collects information about the safety of antiepileptic drug use during pregnancy.  What side effects may I notice from receiving this medicine?  Side effects that you should report to your doctor or health care professional as soon as possible:  -allergic reactions like skin rash, itching or hives, swelling of the face, lips, or tongue  -decreased sweating or a rise in body temperature, especially in patients under 17 years old  -difficulty breathing or tightening of the throat  -feeling faint or lightheaded, falls  -fever, sore throat, sores in your mouth, or bruising easily  -hallucination, loss of contact with reality  -irregular heartbeat  -loss of appetite  -redness, blistering, peeling or loosening of the skin, including inside the mouth  -severe drowsiness, difficulty concentrating, or coordination problems  -speech or language problems  -sudden back pain, abdominal pain, pain when urinating, bloody or dark urine  -suicidal thoughts or depression  -unusual changes in behavior or mood  -unusually weak or tired  -vomiting  Side effects that usually do not require medical attention (report to your doctor or health care professional if they continue or are bothersome):  -headache  -nausea  This list may not describe all possible side effects. Call your doctor for medical advice about side effects. You may report side effects to FDA at 2-113-FDA-1056.  Where should I keep my medicine?  Keep out of reach of children.  Store at room temperature between 15 and 30 degrees C (59 and 86 degrees F). Keep in a dry place protected from  light. Throw away any unused medicine after the expiration date.  NOTE: This sheet is a summary. It may not cover all possible information. If you have questions about this medicine, talk to your doctor, pharmacist, or health care provider.  © 2018 [employer]vier/Gold Standard (2017-01-19 09:50:49)      Seizure, Adult  A seizure is a sudden burst of abnormal electrical activity in the brain. The abnormal activity temporarily interrupts normal brain function, causing a person to experience any of the following:  · Involuntary movements.  · Changes in awareness or consciousness.  · Uncontrollable shaking (convulsions).  Seizures usually last from 30 seconds to 2 minutes. They usually do not cause permanent brain damage unless they are prolonged.  What can cause a seizure to happen?  Seizures can happen for many reasons including:  · A fever.  · Low blood sugar.  · A medicine.  · An illnesses.  · A brain injury.  Some people who have a seizure never have another one. People who have repeated seizures have a condition called epilepsy.  What are the symptoms of a seizure?  Symptoms of a seizure vary greatly from person to person. They include:  · Convulsions.  · Stiffening of the body.  · Involuntary movements of the arms or legs.  · Loss of consciousness.  · Breathing problems.  · Falling suddenly.  · Confusion.  · Head nodding.  · Eye blinking or fluttering.  · Lip smacking.  · Drooling.  · Rapid eye movements.  · Grunting.  · Loss of bladder control and bowel control.  · Staring.  · Unresponsiveness.  Some people have symptoms right before a seizure happens (aura) and right after a seizure happens. Symptoms of an aura include:  · Fear or anxiety.  · Nausea.  · Feeling like the room is spinning (vertigo).  · A feeling of having seen or heard something before (matt vu).  · Odd tastes or smells.  · Changes in vision, such as seeing flashing lights or spots.  Symptoms that may follow a seizure  include:  · Confusion.  · Sleepiness.  · Headache.  · Weakness of one side of the body.  Follow these instructions at home:  Medicines  · Take over-the-counter and prescription medicines only as told by your health care provider.  · Avoid any substances that may prevent your medicine from working properly, such as alcohol.  Activity  · Do not drive, swim, or do any other activities that would be dangerous if you had another seizure. Wait until your health care provider approves.  · If you live in the [place] check with your local DMV (department of [employer]) to find out about the local driving laws. Each state has specific rules about when you can legally return to driving.  · Get enough rest. Lack of sleep can make seizures more likely to occur.  Educating others  Teach friends and family what to do if you have a seizure. They should:  · Lay you on the ground to prevent a fall.  · Cushion your head and body.  · Loosen any tight clothing around your neck.  · Turn you on your side. If vomiting occurs, this helps keep your airway clear.  · Stay with you until you recover.  · Not hold you down. Holding you down will not stop the seizure.  · Not put anything in your mouth.  · Know whether or not you need emergency care.  General instructions  · Contact your health care provider each time you have a seizure.  · Avoid anything that has ever triggered a seizure for you.  · Keep a seizure diary. Record what you remember about each seizure, especially anything that might have triggered the seizure.  · Keep all follow-up visits as told by your health care provider. This is important.  Contact a health care provider if:  · You have another seizure.  · You have seizures more often.  · Your seizure symptoms change.  · You continue to have seizures with treatment.  · You have symptoms of an infection or illness. They might increase your risk of having a seizure.  Get help right away if:  · You have a seizure:  ¨ That lasts  longer than 5 minutes.  ¨ That is different than previous seizures.  ¨ That leaves you unable to speak or use a part of your body.  ¨ That makes it harder to breathe.  ¨ After a head injury.  · You have:  ¨ Multiple seizures in a row.  ¨ Confusion or a severe headache right after a seizure.  · You are having seizures more often.  · You do not wake up immediately after a seizure.  · You injure yourself during a seizure.  These symptoms may represent a serious problem that is an emergency. Do not wait to see if the symptoms will go away. Get medical help right away. Call your local emergency services (911 in the [place] Do not drive yourself to the hospital.   This information is not intended to replace advice given to you by your health care provider. Make sure you discuss any questions you have with your health care provider.  Document Released: 12/15/2001 Document Revised: 08/13/2017 Document Reviewed: 07/21/2017  [employer] Interactive Patient Education © 2017 [employer]      Depression / Suicide Risk    As you are discharged from this [facility] facility, it is important to learn how to keep safe from harming yourself.    Recognize the warning signs:  · Abrupt changes in personality, positive or negative- including increase in energy   · Giving away possessions  · Change in eating patterns- significant weight changes-  positive or negative  · Change in sleeping patterns- unable to sleep or sleeping all the time   · Unwillingness or inability to communicate  · Depression  · Unusual sadness, discouragement and loneliness  · Talk of wanting to die  · Neglect of personal appearance   · Rebelliousness- reckless behavior  · Withdrawal from people/activities they love  · Confusion- inability to concentrate     If you or a loved one observes any of these behaviors or has concerns about self-harm, here's what you can do:  · Talk about it- your feelings and reasons for harming yourself  · Remove any means that you might  use to hurt yourself (examples: pills, rope, extension cords, firearm)  · Get professional help from the community (Mental Health, Substance Abuse, psychological counseling)  · Do not be alone:Call your Safe Contact- someone whom you trust who will be there for you.  · Call your local CRISIS HOTLINE 217-7131 or 667-813-9720  · Call your local Children's Mobile Crisis Response Team [place] (606) 977-6543 or www.[employer]  · Call the toll free National Suicide Prevention Hotlines   · National Suicide Prevention Lifeline 386-901-AAAA (1780)  · National Hope Line Network 800-SUICIDE (202-0147)

## 2018-07-22 NOTE — PROGRESS NOTES
Pt discharged home with friend in private vehicle. PIV removed. Discharge medications, follow up appointments, and instructions explained to pt. And pt expressed verbal understanding. Pt understands not able to drive until clear of seizures per DMV laws..

## 2018-07-22 NOTE — PROGRESS NOTES
Internal Medicine Interval Note  Note Author: Mauro Jack D.O.     Name Ezio Israel     1995   Age/Sex 23 y.o. male   MRN [id number]   Code Status FULL     After 5PM or if no immediate response to page, please call for cross-coverage  Attending/Team: Odilon/Ananda See Patient List for primary contact information  Call [phone] to page    1st Call - Day Intern (R1):   Dr. Jack 2nd Call - Day Sr. Resident (R2/R3):   Dr. Morel         Reason for interval visit  (Principal Problem)       Interval Problem Daily Status Update  (24 hours, problem oriented, brief subjective history, new lab/imaging data pertinent to that problem)     No seizures overnight. Patient states that he slept well through the night. Denies headache, blurry vision, double vision, or photophobia. States that he had some tingling in his right leg that he slept on.    UDS positive for marijuana. Acidosis resolved.    Review of Systems   Constitutional: Negative for chills and fever.   HENT: Negative for hearing loss and tinnitus.    Eyes: Negative for blurred vision, double vision and photophobia.   Respiratory: Negative for cough and shortness of breath.    Cardiovascular: Negative for chest pain, palpitations and leg swelling.   Gastrointestinal: Negative for abdominal pain, constipation, diarrhea, nausea and vomiting.   Genitourinary: Negative for dysuria and frequency.   Musculoskeletal: Negative for falls, myalgias and neck pain.   Skin: Negative for itching and rash.   Neurological: Positive for tingling. Negative for dizziness, sensory change and headaches.        Right leg - likely due to sleeping on it.   Endo/Heme/Allergies: Does not bruise/bleed easily.   Psychiatric/Behavioral: Negative for depression. The patient is not nervous/anxious and does not have insomnia.        Disposition/Barriers to discharge:   Patient appears to be back at baseline. Stable for  d/c.    Consultants/Specialty  Neurology  PCP: Pcp Pt States None      Quality Measures  Quality-Core Measures   Reviewed items::  Labs reviewed, Medications reviewed, EKG reviewed and Radiology images reviewed  Trivedi catheter::  No Trivedi  DVT prophylaxis - mechanical:  SCDs          Physical Exam       Vitals:    07/21/18 1200 07/21/18 1659 07/21/18 2000 07/22/18 0400   BP: 116/74 127/74 120/75 122/76   Pulse: 75 65 62 (!) 46   Resp: 18 19 17 17   Temp: 37.3 °C (99.2 °F) 36.8 °C (98.2 °F) 37.2 °C (99 °F) 37.5 °C (99.5 °F)   SpO2: 98% 97% 98% 97%   Weight:         Body mass index is 21.31 kg/m².    Oxygen Therapy:  Pulse Oximetry: 97 %, O2 (LPM): 0, O2 Delivery: None (Room Air)    Physical Exam   Constitutional: He is oriented to person, place, and time and well-developed, well-nourished, and in no distress. No distress.   HENT:   Right Ear: External ear normal.   Left Ear: External ear normal.   Mouth/Throat: Oropharynx is clear and moist.   Clean and closed laceration on right posterior head   Eyes: EOM are normal. Pupils are equal, round, and reactive to light. Right eye exhibits no discharge. Left eye exhibits no discharge. No scleral icterus.   Neck: Neck supple. No JVD present. No thyromegaly present.   Cardiovascular: Normal rate and regular rhythm.  Exam reveals no gallop and no friction rub.    No murmur heard.  Pulmonary/Chest: Effort normal and breath sounds normal. No stridor. No respiratory distress. He has no rales. He exhibits no tenderness.   Abdominal: Soft. Bowel sounds are normal. He exhibits no mass. There is no tenderness. There is no guarding.   Musculoskeletal: He exhibits no edema or tenderness.   Neurological: He is alert and oriented to person, place, and time. He displays normal reflexes. No cranial nerve deficit. He exhibits normal muscle tone. Coordination normal. GCS score is 15.   Skin: Skin is dry. No rash noted. He is not diaphoretic. No erythema. No pallor.   Psychiatric: Mood,  memory, affect and judgment normal.             Assessment/Plan     * Seizure disorder (HCC)- (present on admission)   Assessment & Plan    The patient has history of seizures that per patient began when he was 20 years old. Last seizure prior to today was in May 2018.   Likely generalized, tonic-clonic.  States that he has periods with multiple seizures followed by months with no seizures.  He does not take any medication for seizures at home or follow up with a neurologist or PCP.  One seizure this morning and subsequently another this afternoon.  Reports that he has taken Keppra before but states that they increased his seizures.  MRI reveals 3 areas of left frontal and temporal hemorrhagic contusions.  Plan:  -Seizure precautions, Fall Precautions, Aspiration Precautions.  -Continue Zonisamide 100 mg and titrate up to 300 mg daily per neurology recommendations.  -Continue Depakote while inpatient, then discontinue.  -Follow up at neurology clinic.  -Q4 hour neuro checks.  -Ativan PRN for breakthrough seizures.

## 2018-07-22 NOTE — PROGRESS NOTES
Assumed care at this time. Report received from ANIKA Wasserman. Pt laying in bed awake, a/o x4. No acute distress noted, respirations even and unlabored, denies pain at this time. Fall precautions in place - bed alarm on, call light within reach, bed locked/ placed in lowest position, side rails up with seizure padding in place, nonslip footwear on, hourly rounding in place, room close to nurses station. Will continue to monitor.

## 2018-07-22 NOTE — CARE PLAN
Problem: Communication  Goal: The ability to communicate needs accurately and effectively will improve  Outcome: PROGRESSING AS EXPECTED  Encourage pt to express needs, ask questions. Include pt in plan of care, ensure pt understanding    Problem: Safety  Goal: Will remain free from injury  Outcome: PROGRESSING AS EXPECTED  Fall precautions in place - call light within reach, nonslip footwear on, bed locked/ placed in lowest position, seizure precautions in place, side rails up x3, room close to nurses station, hourly rounding in place, bed alarm on

## 2018-07-22 NOTE — CARE PLAN
Problem: Communication  Goal: The ability to communicate needs accurately and effectively will improve  Outcome: PROGRESSING AS EXPECTED  Encouraged to express needs    Problem: Safety  Goal: Will remain free from injury    Intervention: Provide assistance with mobility  Up with standby assist. Bed alarm on. Fall precautions reviewed with pt.

## 2018-07-22 NOTE — CONSULTS
Neurology Consultation        Referring Physician:  Dr. Terry Morel    Referral Reason:  Seizures    HPI:  Ezio is a 23-year-old man who was admitted for a cluster of seizures. He reports his first seizure around the age of 15 or 16 associated with taking too much psychedelic mushrooms. He was then seizure free until about the age of 19. Since that time he has had intermittent seizures that can sometimes cluster. He can also go months without a seizure. He was tried on Keppra and he felt like the increased seizure frequency as he was having multiple seizures per week on that medication. He reports an outside EEG was done he was told was normal. He was seen in the ER last night and discharged probably had a generalized seizure in a parking lot striking his head on the concrete. He denies any current headache. He does feel a little woozy but otherwise is feeling close to his normal self.    He does not always get a warning for seizures but sometimes he'll get a sensation of dizziness like someone is tapping him on his left arm and that'll be a warning seizures coming. He has not identified any definite triggers. He has not noticed any correlation to drinking alcohol.    Past Medical History:  Active Ambulatory Problems     Diagnosis Date Noted   • No Active Ambulatory Problems     Resolved Ambulatory Problems     Diagnosis Date Noted   • No Resolved Ambulatory Problems     Past Medical History:   Diagnosis Date   • Seizure disorder (HCC)        Medications:  None at home    Allergies:  Allergies   Allergen Reactions   • Penicillins Hives   • Augmentin    • Lortab [Hydrocodone-Acetaminophen] Rash       Social History:  Social History     Social History   • Marital status: Single     Spouse name: N/A   • Number of children: N/A   • Years of education: N/A     Occupational History   • Not on file.     Social History Main Topics   • Smoking status: Current Every Day Smoker     Packs/day: 0.50   • Smokeless tobacco:  Never Used   • Alcohol use Yes      Comment: occ   • Drug use: Yes     Types: Inhaled      Comment: marijuana    • Sexual activity: Not on file     Other Topics Concern   • Not on file     Social History Narrative   • No narrative on file       Family History:  Family History   Problem Relation Age of Onset   • Stroke Mother 56   • Seizures Neg Hx    • Cancer Neg Hx    • Diabetes Neg Hx        ROS:  All others negative except for what was mentioned in the HPI.    Physical Exam:  Vitals:   Vitals:    07/21/18 0400 07/21/18 0800 07/21/18 1200 07/21/18 1659   BP: 118/66 125/68 116/74 127/74   Pulse: (!) 53 65 75 65   Resp: 18 18 18 19   Temp: 37.7 °C (99.9 °F) 37.6 °C (99.6 °F) 37.3 °C (99.2 °F) 36.8 °C (98.2 °F)   SpO2: 97% 98% 98% 97%   Weight:         GENERAL:  Lying in the hospital bed in no apparent distress.  MENTAL STATUS:  Awake, alert, oriented times 3.  Speech is fluent, comprehension is intact.  CRANIAL NERVES:  PERRL, EOMI with no nystagmus, face is symmetric, facial sensation is intact, tongue is in the midline, palate is symmetric.  MOTOR:  5/5 throughout  REFLEXES:  2+ and symmetric, toes are downgoing bilaterally  SENSATION:  Intact to light touch and proprioception throughout  COORDINATION:  Normal finger to nose and heel to shin bilaterally  GAIT:  Deferred      MRI brain:    1.  3 areas of LEFT frontal and temporal hemorrhagic contusion without discernible mass effect or associated enhancement  2.  No evidence of gray matter heterotopia, gross cortical dysplasia or mesial temporal sclerosis  3.  In light of the patient's reported long-standing history of seizure disorder, follow-up MRI without and with contrast in 3 months is recommended        Impression:  Ezio is a 23-year-old man with likely focal onset seizures with secondary generalization given his history of intermittent auras. 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      Recommendations:  1.  I discussed different seizure medication options for him. He would not like to try Keppra again. Zonisamide is a reasonable option. We can start at 100 mg nightly and then he can increase this to 200 mg nightly after one week and then ultimately up to 300 mg daily at bedtime. I discussed potential side effects of this medication with him. He was in agreement with this plan.  2.  Can stop the Depakote on discharge.  3.  I don't think continue as EEG monitoring as needed during the study since he is back to his baseline seizures are very apparent when they happen. I would recommend he follow up in the neurology clinic for further seizure management. If seizures remain intractable that he could arrange an elective admission for continuous EEG monitoring to better define seizure focus in type.  4.  Dr. Sweet takes over the neurology consult service in the morning.

## 2018-07-22 NOTE — PROGRESS NOTES
Pt resting in bed. Denies pain or needs at this time. Seizure precautions in place. No seizure activity yet this shift. Cooperative with cares, Will monitor.

## 2018-07-24 ENCOUNTER — OFFICE VISIT (OUTPATIENT)
Dept: NEUROLOGY | Facility: MEDICAL CENTER | Age: 23
End: 2018-07-24
Payer: COMMERCIAL

## 2018-07-24 VITALS
TEMPERATURE: 97.5 F | HEART RATE: 59 BPM | BODY MASS INDEX: 20.98 KG/M2 | RESPIRATION RATE: 18 BRPM | SYSTOLIC BLOOD PRESSURE: 122 MMHG | WEIGHT: 172.3 LBS | DIASTOLIC BLOOD PRESSURE: 84 MMHG | OXYGEN SATURATION: 96 % | HEIGHT: 76 IN

## 2018-07-24 DIAGNOSIS — G40.909 SEIZURE DISORDER (HCC): ICD-10-CM

## 2018-07-24 DIAGNOSIS — F19.11 H/O: SUBSTANCE ABUSE (HCC): ICD-10-CM

## 2018-07-24 PROCEDURE — 99205 OFFICE O/P NEW HI 60 MIN: CPT | Performed by: NURSE PRACTITIONER

## 2018-07-24 RX ORDER — LORAZEPAM 1 MG/1
TABLET ORAL
Qty: 20 TAB | Refills: 0 | Status: SHIPPED
Start: 2018-07-24 | End: 2018-08-24

## 2018-07-24 ASSESSMENT — ENCOUNTER SYMPTOMS
DOUBLE VISION: 0
DEPRESSION: 0
HEADACHES: 0
SORE THROAT: 0
NAUSEA: 0
MEMORY LOSS: 1
NERVOUS/ANXIOUS: 1
ABDOMINAL PAIN: 0
VOMITING: 0
SEIZURES: 1
MUSCULOSKELETAL NEGATIVE: 1
DIARRHEA: 0
CONSTITUTIONAL NEGATIVE: 1
COUGH: 1

## 2018-07-24 NOTE — PROGRESS NOTES
"Subjective:      Ezio Israel is a 23 y.o. male who presents with New Patient (Seizures)          HPI History of possible seizures for 4-5 years.  The first few events were when he was \"taking mushrooms\".  He hasn't done mushrooms for a few years now.  He reports an average of 1-2 episodes every 5 months.    In March, he was started on Leviteracetam and he started having seizures every 3 days.  He took this medication for about one month and was \"tired of waking up on the floor\" so stopped it.    No other concern for seizures until Friday, 7/20/2018.  He has a vague recall but to his knowledge, he was very hot and around paint fumes and he lost consciousness.   This LOC occurred around 1030.  Then perhaps had another seizure that day around 1330.    Started on Zonisamide, taking one capsule each morning.  \"They kind of mess me up\".  Feels stoned.    He feels disoriented.  There have been a \"few times when he has woken up on the floor\".  His legs will be cramped.  He is tongue tied but has bit his cheeks.  Has been prone to jerks.    Sleep: \"about normal\", to sleep by 12pm and up by 8am.    Impression       1.  3 areas of LEFT frontal and temporal hemorrhagic contusion without discernible mass effect or associated enhancement  2.  No evidence of gray matter heterotopia, gross cortical dysplasia or mesial temporal sclerosis  3.  In light of the patient's reported long-standing history of seizure disorder, follow-up MRI without and with contrast in 3 months is recommended     Maternal grandparents are primarily associated to him.  Possibly his father had jerks.    Back right side of his head with sutures.    Lives by himself, working full-time.  He is now without a job.    Education: completed senior without graduation.    Marijuana-- smokes every day in the afternoon and evening.  More on the weekends but less than a gram per day.    Has a [place] 's license.    Current Outpatient Prescriptions   Medication " "Sig Dispense Refill   • zonisamide (ZONEGRAN) 100 MG Cap Take One tab daily for 7 days followed by 2 tabs daily for 7 days followed by 3 tabs daily until changed by the neurologist. 100 Cap 0     No current facility-administered medications for this visit.        Review of Systems   Constitutional: Negative.    HENT: Negative for hearing loss, nosebleeds and sore throat.         No recent head injury.   Eyes: Negative for double vision.        No new loss of vision.   Respiratory: Positive for cough (slight).         No recent lung infections.   Cardiovascular: Negative for chest pain.   Gastrointestinal: Negative for abdominal pain, diarrhea, nausea and vomiting.   Genitourinary: Negative.    Musculoskeletal: Negative.    Skin: Negative.    Neurological: Positive for seizures. Negative for headaches.   Endo/Heme/Allergies:        No history of endocrine dysfunction.  No new problems.   Psychiatric/Behavioral: Positive for memory loss. Negative for depression. The patient is nervous/anxious.         No recent mood changes.          Objective:     /84   Pulse (!) 59   Temp 36.4 °C (97.5 °F)   Resp 18   Ht 1.93 m (6' 4\")   Wt 78.2 kg (172 lb 4.8 oz)   SpO2 96%   BMI 20.97 kg/m²      Physical Exam   Constitutional: He is oriented to person, place, and time. He appears well-developed and well-nourished. No distress.   HENT:   Head: Normocephalic and atraumatic.   Nose: Nose normal.   Eyes: Pupils are equal, round, and reactive to light.   Neck: Normal range of motion.   Cardiovascular: Normal rate and regular rhythm.  Exam reveals no gallop and no friction rub.    No murmur heard.  Pulmonary/Chest: Effort normal and breath sounds normal. No respiratory distress.   Lymphadenopathy:     He has no cervical adenopathy.   Neurological: He is alert and oriented to person, place, and time.   CN II: Fundi normal, visual fields full to confrontation.  CN III, IV, VI: Pupils equal, round, and reactive to light.  " Extraocular movements full and intact in horizontal and vertical gaze.  CN V: Normal in motor and sensory modalities.  CN VII: No evidence of facial asymmetry.  CN VIII: Hearing grossly intact.  CN IX, X: Palate elevates symmetrically and in the midline.  CN XI: Normal sternocleidomastoid strength.  CN XII: Tongue is in the midline.    Motor: Normal muscle bulk and tone, with full and symmetric strength.  Sensory: Intact to light touch, pinprick, vibration, proprioception, and graphesthesia.  DTR's: 2+ throughout with flexor plantar responses.  Cerebellar/Coordination: Normal finger to finger, finger-tapping, rapid alternating movements, and foot tapping.  Gait: Normal casual gait.  Walks well on heels and toes, as well as in tandem gait.   Skin: Skin is warm and dry.             Assessment/Plan:     Seizure disorder:  I am very concerned that Mr Israel is experiencing active seizures.    He has had uncontrolled seizures and I do not have an EEG for review.    Has failed Keppra.    Continue ZNS with increase from 100mg qhs to 200mg qhs.    Confirm urgent VEEG on Thursday morning 9am.    DMV form completed-- he is advised that he is not to drive.    Discussed general safety including water and cooking.    Return for follow-up after the EEG.   I spent 60+ minutes with this patient, over fifty percent was spent counseling patient on their condition, best management practices, reviewing test results and risks and benefits of treatment.      EDUCATION AND COUNSELING:  -Education was provided to the patient and/or family regarding diagnosis and prognosis. The chronic and unpredictable nature of the condition was discussed. There is increased risk for additional events, which may carry potential for significant injuries and death.    -We reviewed the current antiepileptic regimen. Potential side effects of antiepileptics were discussed at length, including but no limited to: hypersensitivity reactions (rash and others, some  of which can be fatal), visual field changes (some of which may be irreversible), glaucoma, diplopia, kidney stones, osteopenia/osteoporosis/bone fractures, hyperthermia/anhydrosis, tremors, ataxia, dizziness, fatigue, increased risk for falls, cardiac arrhythmias/syncope, gastrointestinal (hepatitis, pancreatitis, gastritis, ulcers), gingival hypertrophy, drowsiness, sedation, anxiety/nervousness, increased risk for suicide, increased risk for depression, and psychosis. We reviewed drug-drug interactions and their potential effect on seizure control and medication side effects.    -Patient/family educated on SUDEP (Sudden Death in Epilepsy). Counseling was provided on the importance of strict medication and follow up compliance. The patient understands the risks associated with non-adherence with the medical plan as outlined, including but not limited to an increased risk for breakthrough seizures, which may contribute to injuries, disability, status epilepticus, and even death.    -Counseling was also provided on potential effects of alcohol and other drugs, which may lower seizure threshold and/or affect the metabolism of antiepileptic drugs. I recommend avoidance of alcohol and illegal drugs.  -Recommend proper hydration, regular exercise, proper sleep hygiene (7-8 hrs of overnight sleep, avoid sleep deprivation).    -I have made the patient aware of mandatory reporting required by the law in the State of [place] regarding episodes of seizures, loss of consciousness, and/or alteration of awareness. The patient and family are responsible for reporting events to the DMV, instructions were provided. The patient verbalized understanding and states he has not been driving. Other seizure precautions were discussed at length, including no diving, no skydiving, no unsupervised swimming, no Jacuzzi or bathing in bathtubs.    -He is not okay to work at this time.  Pt agrees with plan.

## 2018-07-24 NOTE — DISCHARGE SUMMARY
Internal Medicine Discharge Summary  Note Author: Terry Morel M.D.       Name Ezio Israel     1995   Age/Sex 23 y.o. male   MRN [id number]         Admit Date:  2018       Discharge Date:   2018    Service:   [facility] Internal Medicine Yellow Team  Attending Physician(s):   Dr. Donald       Senior Resident(s):   Dr. Morel  Jose G Resident(s):   Dr. Jack  PCP: Pcp Pt States None      Primary Diagnosis:   Seizure    Secondary Diagnoses:                Principal Problem:    Seizure disorder (HCC) POA: Yes  Active Problems:    Contusion of brain with loss of consciousness (HCC) POA: Unknown    H/O: substance abuse POA: Unknown  Resolved Problems:    Hypokalemia POA: Unknown    Acidosis POA: Yes    Leukocytosis POA: Yes      Hospital Summary (Brief Narrative):       Mr. London is a very pleasant 23-year-old male with past medical history ER after 2 seizures on the day of admission.  He never did have neurology follow previously, but did try Keppra 2 months ago which reportedly made the seizures worse.    After admission, he had one more seizure which was treated with Ativan.  He did not have any postictal confusion or weakness, but MRI did show 3 hemorrhagic cerebral contusions which was result of head trauma when he had the first seizure episode prior to admission.    He was initially placed on valproic acid and neurology was not consulted.  On neurology recommendations, the valproic acid was switched to zonisamide.  Patient was seizure-free for about 24 hours and therefore was discharged in a stable condition with outpatient follow-up including neurology and PCP appointments.    Patient /Hospital Summary (Details -- Problem Oriented) :          * Seizure disorder (HCC)   Assessment & Plan    The patient has history of seizures that per patient began when he was 20 years old. Last seizure prior to today was in May 2018.   Likely focalized seizures with some generalization as per  neurology.  He does not take any medication for seizures at home or follow up with a neurologist or PCP.  Reports that he has taken Keppra before but states that they increased his seizures.  MRI reveals 3 areas of left frontal and temporal hemorrhagic contusions.  Continue Zonisamide 100 mg and titrate up to 300 mg daily per neurology recommendations.  Follow up at neurology clinic.        Hypokalemia-resolved as of 7/22/2018   Assessment & Plan    Initially, was hyperkalemic.  Repleted with potassium while inpatient.  Resolved on discharge.        Leukocytosis-resolved as of 7/22/2018   Assessment & Plan    Likely reactive secondary to seizure. Patient is afebrile with no signs of acute infection.  Resolved as of 7/22.          Acidosis-resolved as of 7/22/2018   Assessment & Plan    On admission, patient presented with an anion gap of 20 with a bicarb of 14. Likely metabolic acidosis with anion gap.  Lactic acid 0.9.  Alcohol 0.01.  Urine drug screen pos. For cannabanoids only.  Resolved on discharge.            Consultants:     Neurology: Dr. Campbell    Procedures:        None    Imaging/ Testing:      MR-BRAIN-WITH & W/O   Final Result      1.  3 areas of LEFT frontal and temporal hemorrhagic contusion without discernible mass effect or associated enhancement   2.  No evidence of gray matter heterotopia, gross cortical dysplasia or mesial temporal sclerosis   3.  In light of the patient's reported long-standing history of seizure disorder, follow-up MRI without and with contrast in 3 months is recommended      Findings were discussed with FRANSISCO MONTESINOS on 7/20/2018 5:55 PM.      CT-HEAD W/O   Final Result      No intracranial mass effect or acute hemorrhage.            Discharge Medications:         Medication Reconciliation: Completed       Medication List      START taking these medications      Instructions   zonisamide 100 MG Caps  Commonly known as:  ZONEGRAN   Doctor's comments:  Take One tab daily for 7  days followed by 2 tabs daily for 7 days followed by 3 tabs daily until changed by the neurologist.  Take One tab daily for 7 days followed by 2 tabs daily for 7 days followed by 3 tabs daily until changed by the neurologist.                Disposition:   Discharged home    Diet:   Healthy diet    Activity:   As tolerated    Instructions:       The patient was instructed to return to the ER in the event of worsening symptoms. I have counseled the patient on the importance of compliance and the patient has agreed to proceed with all medical recommendations and follow up plan indicated above.   The patient understands that all medications come with benefits and risks. Risks may include permanent injury or death and these risks can be minimized with close reassessment and monitoring.        Primary Care Provider:    Pcp Pt States None    Discharge summary faxed to primary care provider:  Deferred  Copy of discharge summary given to the patient: Deferred      Follow up appointment details :      Scheduled follow-up appointments with neurology and PCP.    Pending Studies:        None pending    Time spent on discharge day patient visit, preparing discharge paperwork and arranging for patient follow up.    Summary of follow up issues:   We will need follow-up regarding seizure management.    Discharge Time (Minutes) :    29 minutes  Hospital Course Type:  Inpatient Stay >2 midnights      Condition on Discharge    ______________________________________________________________________    Interval history/exam for day of discharge:    Patient was seizure-free for more than 24 hours on day of discharge.  Neurology was consulted, switched antiepileptic medications to zonisamide and outpatient neurology follow-up as scheduled.      Most Recent Labs:    Lab Results   Component Value Date/Time    WBC 6.9 07/22/2018 03:40 AM    RBC 4.50 (L) 07/22/2018 03:40 AM    HEMOGLOBIN 14.4 07/22/2018 03:40 AM    HEMATOCRIT 41.5 (L)  07/22/2018 03:40 AM    MCV 92.2 07/22/2018 03:40 AM    MCH 32.0 07/22/2018 03:40 AM    MCHC 34.7 07/22/2018 03:40 AM    MPV 12.9 07/22/2018 03:40 AM    NEUTSPOLYS 67.00 07/22/2018 03:40 AM    LYMPHOCYTES 20.10 (L) 07/22/2018 03:40 AM    MONOCYTES 12.40 07/22/2018 03:40 AM    EOSINOPHILS 0.10 07/22/2018 03:40 AM    BASOPHILS 0.30 07/22/2018 03:40 AM    HYPOCHROMIA 1+ 08/05/2014 08:07 AM      Lab Results   Component Value Date/Time    SODIUM 141 07/22/2018 03:40 AM    POTASSIUM 3.3 (L) 07/22/2018 03:40 AM    CHLORIDE 110 07/22/2018 03:40 AM    CO2 22 07/22/2018 03:40 AM    GLUCOSE 87 07/22/2018 03:40 AM    BUN 4 (L) 07/22/2018 03:40 AM    CREATININE 0.90 07/22/2018 03:40 AM      Lab Results   Component Value Date/Time    ALTSGPT 13 07/22/2018 03:40 AM    ASTSGOT 21 07/22/2018 03:40 AM    ALKPHOSPHAT 60 07/22/2018 03:40 AM    TBILIRUBIN 0.9 07/22/2018 03:40 AM    LIPASE 14 08/05/2014 08:07 AM    ALBUMIN 4.0 07/22/2018 03:40 AM    GLOBULIN 2.5 07/22/2018 03:40 AM    INR 1.09 07/20/2018 02:20 PM     Lab Results   Component Value Date/Time    PROTHROMBTM 13.8 07/20/2018 02:20 PM    INR 1.09 07/20/2018 02:20 PM

## 2018-07-26 ENCOUNTER — NON-PROVIDER VISIT (OUTPATIENT)
Dept: NEUROLOGY | Facility: MEDICAL CENTER | Age: 23
End: 2018-07-26
Payer: COMMERCIAL

## 2018-07-26 DIAGNOSIS — G40.909 SEIZURE DISORDER (HCC): ICD-10-CM

## 2018-07-26 PROCEDURE — 95951 PR EEG MONITORING/VIDEORECORD: CPT | Mod: 52 | Performed by: PSYCHIATRY & NEUROLOGY

## 2018-07-27 NOTE — PROGRESS NOTES
"ROUTINE VIDEO ELECTROENCEPHALOGRAM REPORT      NAME: Ezio Israel    REFERRING Dr: Geri    DURATION: 26 minutes    INDICATION: EEG for pt who has had possible HX of SZ for 5 years. Having events of  \"waking up on floor\", 7/20/18 has LOC at 1030 and 1330. Pt states he feels disoriented, cramps, and proe to jerks. ]  3 areas of Left frontal and tempral hemorrhagic contusion.   No major medical HX      TECHNIQUE: 30 channel routine electroencephalogram (EEG) was performed in accordance with the international 10-20 system. The study was reviewed in bipolar and referential montages. The recording examined the patient during wakeful and drowsy state(s).     DESCRIPTION OF THE RECORD:        Background rhythm during awake stage shows well-organized, well-developed, average voltage 10 to 11 hertz alpha activity in the posterior regions.  It blocks with eye opening and it is bilaterally synchronous and symmetrical.  No spike-and-wave discharges but there are prolonged monomorphic frontal delta abnormalities-- up to 30 seconds-- multiple episodes-- even without HV. Photic stimulation did not produce any abnormalities. Hyperventilation worsened the frontal slow  Stage I sleep was achieved.      ACTIVATION PROCEDURES:      Hyperventilation and Photic Stimulation were done    ICTAL AND/OR INTERICTAL FINDINGS:    No spike-and-wave discharges but there are prolonged monomorphic frontal delta abnormalities-- up to 30 seconds-- multiple episodes-- even without HV.   No clinical events or seizures were reported or recorded during the study.      EKG: sampling of the EKG recording demonstrated sinus rhythm.        INTERPRETATION:        ________________________________________________________________________    This is abnormal routine video EEG recording in the awake and drowsy/sleep state(s).    This scalp EEG denotes      2. Prominant focal cortical dysfunction/irritabiliyt over frontal --frontal lobe subclinical " seizures are likely    Advise Increase of seizure medication or even prolonged inpatient EEG monitoring.      ________________________________________________________________________

## 2018-07-27 NOTE — PROCEDURES
"DATE OF SERVICE:  07/26/2018    This is an outpatient EEG was done on 07/26/2018.    ROUTINE VIDEO ELECTROENCEPHALOGRAM REPORT        NAME: Ezio Israel     REFERRING Dr: Geri     DURATION: 26 minutes     INDICATION: EEG for pt who has had possible HX of SZ for 5 years. Having events of  \"waking up on floor\", 7/20/18 has LOC at 1030 and 1330. Pt states he feels disoriented, cramps, and proe to jerks. ]  3 areas of Left frontal and tempral hemorrhagic contusion.   No major medical HX        TECHNIQUE: 30 channel routine electroencephalogram (EEG) was performed in accordance with the international 10-20 system. The study was reviewed in bipolar and referential montages. The recording examined the patient during wakeful and drowsy state(s).      DESCRIPTION OF THE RECORD:           Background rhythm during awake stage shows well-organized, well-developed, average voltage 10 to 11 hertz alpha activity in the posterior regions.  It blocks with eye opening and it is bilaterally synchronous and symmetrical.  No spike-and-wave discharges but there are prolonged monomorphic frontal delta abnormalities-- up to 30 seconds-- multiple episodes-- even without HV. Photic stimulation did not produce any abnormalities. Hyperventilation worsened the frontal slow  Stage I sleep was achieved.        ACTIVATION PROCEDURES:       Hyperventilation and Photic Stimulation were done     ICTAL AND/OR INTERICTAL FINDINGS:    No spike-and-wave discharges but there are prolonged monomorphic frontal delta abnormalities-- up to 30 seconds-- multiple episodes-- even without HV.   No clinical events or seizures were reported or recorded during the study.       EKG: sampling of the EKG recording demonstrated sinus rhythm.          INTERPRETATION:           ________________________________________________________________________     This is abnormal routine video EEG recording in the awake and drowsy/sleep state(s).     This scalp EEG " denotes                  2. Prominant focal cortical dysfunction/irritabiliyt over frontal --frontal lobe subclinical seizures are likely     Advise Increase of seizure medication or even prolonged inpatient EEG monitoring.        ________________________________________________________________________                         ____________________________________     YEMD KASIA DA SILVA / ELMA    DD:  07/27/2018 13:53:05  DT:  07/27/2018 14:13:19    D#:  [id number]  Job#:  [id number]    cc: RIDGE GIRON

## 2018-07-29 ENCOUNTER — OFFICE VISIT (OUTPATIENT)
Dept: URGENT CARE | Facility: PHYSICIAN GROUP | Age: 23
End: 2018-07-29
Payer: COMMERCIAL

## 2018-07-29 VITALS
WEIGHT: 172 LBS | DIASTOLIC BLOOD PRESSURE: 80 MMHG | RESPIRATION RATE: 16 BRPM | TEMPERATURE: 98.4 F | BODY MASS INDEX: 20.94 KG/M2 | SYSTOLIC BLOOD PRESSURE: 120 MMHG | HEART RATE: 60 BPM | OXYGEN SATURATION: 100 %

## 2018-07-29 DIAGNOSIS — S01.01XD LACERATION OF SCALP, SUBSEQUENT ENCOUNTER: ICD-10-CM

## 2018-07-29 PROCEDURE — 99212 OFFICE O/P EST SF 10 MIN: CPT | Performed by: FAMILY MEDICINE

## 2018-07-29 ASSESSMENT — ENCOUNTER SYMPTOMS
VOMITING: 0
FEVER: 0
NAUSEA: 0
CHILLS: 0
ABDOMINAL PAIN: 0
SORE THROAT: 0
PALPITATIONS: 0
NECK PAIN: 0
HEADACHES: 0
COUGH: 0
SPUTUM PRODUCTION: 0

## 2018-07-29 NOTE — PROGRESS NOTES
Subjective:      Ezio Israel is a 23 y.o. male who presents with Suture / Staple Removal (staple removal on back of head)            HPI  23 year old male presents for suture revomal on the scalp. They were placed in on 7/20/18 due to laceration. It happened during his seizure episode. Denies drainage, redness, fever chills.     Review of Systems   Constitutional: Negative for chills and fever.   HENT: Negative for congestion, ear pain and sore throat.    Respiratory: Negative for cough and sputum production.    Cardiovascular: Negative for chest pain and palpitations.   Gastrointestinal: Negative for abdominal pain, nausea and vomiting.   Musculoskeletal: Negative for neck pain.   Skin:        Lac on lept parietal area   Neurological: Negative for headaches.          Objective:     /80   Pulse 60   Temp 36.9 °C (98.4 °F)   Resp 16   Wt 78 kg (172 lb)   SpO2 100%   BMI 20.94 kg/m²      Physical Exam   Constitutional: He appears well-developed and well-nourished.   HENT:   Head: Normocephalic.   Cardiovascular: Normal rate and regular rhythm.    No murmur heard.  Pulmonary/Chest: Effort normal and breath sounds normal. No respiratory distress.   Skin: Skin is warm and dry.        Well approximated laceration. Wound looks C/D/I   Psychiatric: He has a normal mood and affect.               Assessment/Plan:     Laceration   - here for staple removal. Wound looked C/D/I  - cleaned and staples were removed  - wound care discussed

## 2018-08-01 ENCOUNTER — OFFICE VISIT (OUTPATIENT)
Dept: NEUROLOGY | Facility: MEDICAL CENTER | Age: 23
End: 2018-08-01
Payer: COMMERCIAL

## 2018-08-01 VITALS
RESPIRATION RATE: 14 BRPM | BODY MASS INDEX: 20.95 KG/M2 | OXYGEN SATURATION: 97 % | WEIGHT: 172 LBS | HEART RATE: 55 BPM | DIASTOLIC BLOOD PRESSURE: 76 MMHG | TEMPERATURE: 97.5 F | SYSTOLIC BLOOD PRESSURE: 110 MMHG | HEIGHT: 76 IN

## 2018-08-01 DIAGNOSIS — G40.909 SEIZURE DISORDER (HCC): ICD-10-CM

## 2018-08-01 PROCEDURE — 99213 OFFICE O/P EST LOW 20 MIN: CPT | Performed by: NURSE PRACTITIONER

## 2018-08-01 RX ORDER — CLOBAZAM 20 MG/1
10 TABLET ORAL
Qty: 30 TAB | Refills: 2 | Status: SHIPPED
Start: 2018-08-01 | End: 2018-10-16 | Stop reason: SDUPTHER

## 2018-08-01 RX ORDER — LACOSAMIDE 200 MG/1
TABLET ORAL
Qty: 60 TAB | Refills: 5 | Status: SHIPPED
Start: 2018-08-01 | End: 2018-10-17

## 2018-08-01 ASSESSMENT — PAIN SCALES - GENERAL: PAINLEVEL: NO PAIN

## 2018-08-01 NOTE — PROGRESS NOTES
"Subjective:      Ezio Israel is a 23 y.o. male who presents with Follow-Up (EEG, Seizure)          HPI  He thinks he had a seizure during the night before the EEG on 7/26/2018.  He had a bruise to his right foot and felt \"out of it\" for about 90 minutes.    He tried taking ZNS 200mg qhs and felt very sleepy and couldn't \"function\".  He has tried and failed LEV because he was activated with the medication.    INTERPRETATION:   ________________________________________________________________________     This is abnormal routine video EEG recording in the awake and drowsy/sleep state(s).     This scalp EEG denotes                  2. Prominant focal cortical dysfunction/irritabiliyt over frontal --frontal lobe subclinical seizures are likely     Advise Increase of seizure medication or even prolonged inpatient EEG monitoring.     Current Outpatient Prescriptions   Medication Sig Dispense Refill   • LORazepam (ATIVAN) 1 MG Tab Take 1/2-1 tablet PO PRN breakthrough seizures.  Do not exceed more than 2 tablets in 24 hours unless directed otherwise by physician. 20 Tab 0   • zonisamide (ZONEGRAN) 100 MG Cap Take One tab daily for 7 days followed by 2 tabs daily for 7 days followed by 3 tabs daily until changed by the neurologist. 100 Cap 0     No current facility-administered medications for this visit.          Review of Systems   HENT: Negative for hearing loss, nosebleeds and sore throat.         No recent head injury.   Eyes: Negative for double vision.        No new loss of vision.   Respiratory: Negative for cough.         No recent lung infections.   Cardiovascular: Negative for chest pain.   Gastrointestinal: Negative for abdominal pain, diarrhea, nausea and vomiting.   Genitourinary: Negative.    Musculoskeletal: Negative.    Skin: Negative.    Neurological: Positive for seizures. Negative for headaches.   Endo/Heme/Allergies:        No history of endocrine dysfunction.  No new problems. " "  Psychiatric/Behavioral: Positive for memory loss. Negative for depression. The patient is nervous/anxious.         No recent mood changes.          Objective:     /76   Pulse (!) 55   Temp 36.4 °C (97.5 °F)   Resp 14   Ht 1.93 m (6' 4\")   Wt 78 kg (172 lb)   SpO2 97%   BMI 20.94 kg/m²      Physical Exam   Constitutional: He is oriented to person, place, and time. He appears well-developed and well-nourished. No distress.   HENT:   Head: Normocephalic and atraumatic.   Nose: Nose normal.   No new hearing loss.   Eyes: EOM are normal.   No double vision or loss of vision.   Neck: Normal range of motion.   Cardiovascular: Normal rate, regular rhythm and normal heart sounds.    No recent chest pain.   Pulmonary/Chest: Effort normal.   Abdominal: There is no tenderness.   Genitourinary:   Genitourinary Comments: No recent infections.   Musculoskeletal: Normal range of motion.   Lymphadenopathy:     He has no cervical adenopathy.   Neurological: He is alert and oriented to person, place, and time. He has normal strength and normal reflexes. He displays no tremor. No cranial nerve deficit. He displays no seizure activity. Gait normal.   No observable changes in neurologic status.  See initial new patient examination for details.     Skin: Skin is warm and dry.   Psychiatric: He has a normal mood and affect. His speech is normal. His mood appears not anxious. He does not exhibit a depressed mood.           Assessment/Plan:     Seizure disorder:  I am very concerned that Mr Israel is experiencing active seizures.     We discussed his plan of care at this time and he agrees with the following plan:    Start Onfi 10mg qhs X1 week then 20mg qhs thereafter.  Start Vimpat 100mg BID X 1 week then 100-200mg X 1 week then 200mg BID thereafter.    He will stop the ZNS 100mg qhs dose.    Recommend a daily MVI.    Discussed the option of an EMU monitoring stay.  We will confirm this next step at the next " appointment.    He is not to drive at this time.  He has been fired from his job which is a huge stressor for him.    Discussed general safety including water and cooking.  Discussed SUDEP.    He has ativan 1mg tablets and discussed the appropriate usage.    Return for follow-up in 2 months or sooner if needed.      EDUCATION AND COUNSELING:  -Education was provided to the patient and/or family regarding diagnosis and prognosis. The chronic and unpredictable nature of the condition was discussed. There is increased risk for additional events, which may carry potential for significant injuries and death.    -We reviewed the current antiepileptic regimen. Potential side effects of antiepileptics were discussed at length, including but no limited to: hypersensitivity reactions (rash and others, some of which can be fatal), visual field changes (some of which may be irreversible), glaucoma, diplopia, kidney stones, osteopenia/osteoporosis/bone fractures, hyperthermia/anhydrosis, tremors, ataxia, dizziness, fatigue, increased risk for falls, cardiac arrhythmias/syncope, gastrointestinal (hepatitis, pancreatitis, gastritis, ulcers), gingival hypertrophy, drowsiness, sedation, anxiety/nervousness, increased risk for suicide, increased risk for depression, and psychosis. We reviewed drug-drug interactions and their potential effect on seizure control and medication side effects.    -Patient/family educated on SUDEP (Sudden Death in Epilepsy). Counseling was provided on the importance of strict medication and follow up compliance. The patient understands the risks associated with non-adherence with the medical plan as outlined, including but not limited to an increased risk for breakthrough seizures, which may contribute to injuries, disability, status epilepticus, and even death.    -Counseling was also provided on potential effects of alcohol and other drugs, which may lower seizure threshold and/or affect the metabolism  of antiepileptic drugs. I recommend avoidance of alcohol and illegal drugs.  -Recommend proper hydration, regular exercise, proper sleep hygiene (7-8 hrs of overnight sleep, avoid sleep deprivation).    -I have made the patient aware of mandatory reporting required by the law in the State of [place] regarding episodes of seizures, loss of consciousness, and/or alteration of awareness. The patient and family are responsible for reporting events to the DMV, instructions were provided. The patient verbalized understanding and states he has not been driving. Other seizure precautions were discussed at length, including no diving, no skydiving, no unsupervised swimming, no Jacuzzi or bathing in bathtubs.      Pt agrees with plan.

## 2018-08-08 ENCOUNTER — OFFICE VISIT (OUTPATIENT)
Dept: INTERNAL MEDICINE | Facility: MEDICAL CENTER | Age: 23
End: 2018-08-08
Payer: COMMERCIAL

## 2018-08-08 VITALS
WEIGHT: 168 LBS | HEIGHT: 76 IN | SYSTOLIC BLOOD PRESSURE: 130 MMHG | OXYGEN SATURATION: 95 % | DIASTOLIC BLOOD PRESSURE: 90 MMHG | HEART RATE: 80 BPM | TEMPERATURE: 97.6 F | BODY MASS INDEX: 20.46 KG/M2

## 2018-08-08 DIAGNOSIS — D69.6 THROMBOCYTOPENIA (HCC): ICD-10-CM

## 2018-08-08 DIAGNOSIS — F12.90 MARIJUANA USE: ICD-10-CM

## 2018-08-08 DIAGNOSIS — G40.909 SEIZURE DISORDER (HCC): ICD-10-CM

## 2018-08-08 DIAGNOSIS — Z72.0 TOBACCO ABUSE: ICD-10-CM

## 2018-08-08 PROCEDURE — 99215 OFFICE O/P EST HI 40 MIN: CPT | Mod: GC | Performed by: INTERNAL MEDICINE

## 2018-08-08 ASSESSMENT — ENCOUNTER SYMPTOMS
COUGH: 0
PALPITATIONS: 0
INSOMNIA: 0
DIZZINESS: 0
SEIZURES: 1
FEVER: 0
HEADACHES: 0
WHEEZING: 0
CHILLS: 0
SORE THROAT: 0
CONSTIPATION: 0
WEIGHT LOSS: 0
ABDOMINAL PAIN: 0
DIARRHEA: 0

## 2018-08-08 ASSESSMENT — PAIN SCALES - GENERAL: PAINLEVEL: NO PAIN

## 2018-08-08 ASSESSMENT — LIFESTYLE VARIABLES: SUBSTANCE_ABUSE: 1

## 2018-08-08 NOTE — PROGRESS NOTES
New Patient to Establish    Reason to establish: New patient to establish    CC: Establish care     HPI: The patient is a 23-year-old male, with a past medical history of seizures.    1. Seizures: The first time he presented to the ED with seizures was in February 2018, but he reports having had seizures from the age of 18 years but never saw a doctor for evaluation.  His first episode was a tonic-clonic seizure associated with incontinence, tongue biting, a postictal period and a fall leading to abrasion of his forehead and nose.  A complete biochemical and toxicology workup was done at that which was negative along with a negative CT of the Head. He was prescribed Keppra,  but he continued to have seizures and presented to the ED last month after suffering a laceration on his scalp from his seizure.  MRI showed LEFT frontal and temporal hemorrhagic contusion and his medication was changed from Keppra to Vimpat 200 mg and Onfi 20 mg.    2. Headaches - He had migraine like headaches in the past but ever since he started the new seizure medications his headaches have improved significantly.  He also reports having better sleep.    3. Smoking and marijuana use -he currently smokes half a pack of cigarettes every day.  He has a smoking history of 5-pack-years.  He tried to quit smoking by using nicotine patches, which did not help.  He also smokes marijuana 2-3 times a day.     No major surgeries in the past.  Had boxer's fracture of his right hand and base of the fourth middle phalanx of his left hand.     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  He lives with his grandparents.    He is sexually active and uses condoms for contraception.  No history of STDs in the past.    He is unsure about his vaccination history, he assumes he received all his vaccinations up till the age of 18 years.     His biochemical workup is significant for  thrombocytopenia- 100K    His mother suffered a stroke at the age of 46.     Patient Active Problem List    Diagnosis Date Noted   • Seizure disorder ([facility]) 07/20/2018     Priority: High   • Tobacco abuse 08/08/2018   • Thrombocytopenia ([facility]) 08/08/2018   • Marijuana use 08/08/2018   • Contusion of brain with loss of consciousness ([facility]) 07/21/2018   • H/O: substance abuse 07/21/2018       Past Medical History:   Diagnosis Date   • Seizure disorder ([facility])        Current Outpatient Prescriptions   Medication Sig Dispense Refill   • CloBAZam (ONFI) 20 MG Tab Take 10 mg by mouth every bedtime for 7 days. Then increase to 20mg po qhs thereafter 30 Tab 2   • lacosamide (VIMPAT) 200 MG Tab tablet Take 1/2 tablet po BID X 1 week then 1/2 tablet po qam and one tablet po qhs X 1 week then one tablet po BID thereafter. 60 Tab 5   • LORazepam (ATIVAN) 1 MG Tab Take 1/2-1 tablet PO PRN breakthrough seizures.  Do not exceed more than 2 tablets in 24 hours unless directed otherwise by physician. 20 Tab 0     No current facility-administered medications for this visit.        Allergies as of 08/08/2018 - Reviewed 08/08/2018   Allergen Reaction Noted   • Penicillins Hives 07/20/2018   • Augmentin  12/06/2012   • Lortab [hydrocodone-acetaminophen] Rash 09/18/2012       Social History     Social History   • Marital status: Single     Spouse name: N/A   • Number of children: N/A   • Years of education: N/A     Occupational History   • Not on file.     Social History Main Topics   • Smoking status: Current Every Day Smoker     Packs/day: 0.25     Types: Cigarettes   • Smokeless tobacco: Never Used   • Alcohol use Yes      Comment: occ   • Drug use: Yes     Types: Inhaled, Marijuana      Comment: marijuana    • Sexual activity: Yes     Partners: Female     Birth control/ protection: Condom     Other Topics Concern   • Not on file     Social History Narrative   • No narrative on file       Family History   Problem Relation Age of Onset   •  "Stroke Mother 56   • Seizures Neg Hx    • Cancer Neg Hx    • Diabetes Neg Hx        Past Surgical History:   Procedure Laterality Date   • WRIST FUSION Left     Patient reports left wrist surgery at age 2       Review of Systems   Constitutional: Negative for chills, fever and weight loss.   HENT: Negative for sore throat.    Respiratory: Negative for cough and wheezing.    Cardiovascular: Negative for chest pain and palpitations.   Gastrointestinal: Negative for abdominal pain, constipation and diarrhea.   Genitourinary: Negative for dysuria.   Skin: Negative for rash.   Neurological: Positive for seizures. Negative for dizziness and headaches.   Psychiatric/Behavioral: Positive for substance abuse. The patient does not have insomnia.          /90   Pulse 80   Temp 36.4 °C (97.6 °F)   Ht 1.93 m (6' 4\")   Wt 76.2 kg (168 lb)   SpO2 95%   BMI 20.45 kg/m²     Physical Exam   Constitutional: He is oriented to person, place, and time. No distress.   HENT:   Head: Atraumatic.   Eyes: Conjunctivae are normal.   Cardiovascular: Normal rate, regular rhythm and normal heart sounds.    Pulmonary/Chest: Breath sounds normal. He has no wheezes.   Abdominal: Soft. Bowel sounds are normal. He exhibits no distension.   Musculoskeletal: He exhibits no edema.   Lymphadenopathy:     He has no cervical adenopathy.   Neurological: He is alert and oriented to person, place, and time. He has normal reflexes.   Psychiatric: Affect normal.       Note: I have reviewed all pertinent labs and diagnostic tests associated with this visit with specific comments listed under the assessment and plan below    Assessment and Plan    1. Seizure disorder (HCC)  - Has been having seizures since the age of 18 years.   - MRI in 7/2018 shows cerebral hemorrhage in the frontal and temporal lobes.  - Last seizure was in July , has been seizure-free for the last 2 weeks.  - He is currently on Vimpat 200 mg and Onfi 20 mg.  -Will continue the same " medications and he has to follow up with his neurologist Dr. Staci Macias in 3 months with MRI.     2. Tobacco abuse  - Has a history of 5 pack years; smokes half a pack every day.  -He is at high risk for cardiovascular diseases given his family history (mother suffered stroke at age 46 years).  - Explained the risks of smoking and encouraged him to quit smoking with the help of nicotine gum/patches.  -Patient understands the risk and has tried several times in the past, and has not ready to quit yet.    3. Thrombocytopenia (HCC)  - His platelet counts have been around 100K   - Has no symptoms or signs of easy bruising or mucosal bleeding.   - We will monitor his platelet counts.     4. Marijuana use  -Patient smokes weed 2-3 times a day.  -Patient understands the risks but is not ready to quit yet.    5. Preventive care  - He is unsure about his vaccination history, he assumes he received all his vaccinations up till the age of 18 years.   -   -He would like to return with his immunization records and get vaccinated on his next visit    Followup: Return in about 6 months (around 2/8/2019).    Signed by: Jose Antonio Rodriguez M.D.

## 2018-08-08 NOTE — PATIENT INSTRUCTIONS
Advice to quit smoking and try nicotine patches.   Get information on vaccines taken and follow up when you ready to receive them.

## 2018-08-16 ASSESSMENT — ENCOUNTER SYMPTOMS
HEADACHES: 0
NAUSEA: 0
NERVOUS/ANXIOUS: 1
COUGH: 0
DEPRESSION: 0
SORE THROAT: 0
DIARRHEA: 0
MUSCULOSKELETAL NEGATIVE: 1
MEMORY LOSS: 1
DOUBLE VISION: 0
ABDOMINAL PAIN: 0
VOMITING: 0
SEIZURES: 1

## 2018-10-16 ENCOUNTER — OFFICE VISIT (OUTPATIENT)
Dept: NEUROLOGY | Facility: MEDICAL CENTER | Age: 23
End: 2018-10-16
Payer: COMMERCIAL

## 2018-10-16 VITALS
BODY MASS INDEX: 21.31 KG/M2 | WEIGHT: 175 LBS | HEIGHT: 76 IN | SYSTOLIC BLOOD PRESSURE: 130 MMHG | OXYGEN SATURATION: 97 % | DIASTOLIC BLOOD PRESSURE: 88 MMHG | HEART RATE: 97 BPM | TEMPERATURE: 98.4 F

## 2018-10-16 DIAGNOSIS — G40.909 SEIZURE DISORDER (HCC): ICD-10-CM

## 2018-10-16 PROCEDURE — 99213 OFFICE O/P EST LOW 20 MIN: CPT | Performed by: NURSE PRACTITIONER

## 2018-10-16 RX ORDER — CLOBAZAM 20 MG/1
10 TABLET ORAL
Qty: 30 TAB | Refills: 5 | Status: SHIPPED | OUTPATIENT
Start: 2018-10-16 | End: 2018-11-15

## 2018-10-16 ASSESSMENT — ENCOUNTER SYMPTOMS
DEPRESSION: 0
DIARRHEA: 0
DOUBLE VISION: 0
MUSCULOSKELETAL NEGATIVE: 1
SORE THROAT: 1
ABDOMINAL PAIN: 0
VOMITING: 0
COUGH: 0
NERVOUS/ANXIOUS: 1
NAUSEA: 0
HEADACHES: 0

## 2018-10-16 NOTE — PROGRESS NOTES
Subjective:      Ezio Israel is a 23 y.o. male who presents with Follow-Up (EEG [facility])          HPI  He thinks he had a seizure during the night before the EEG on 7/26/2018.     He developed a torso rash with the Vimpat.  He stopped this medication without direction.  Only taking Onfi 10mg BID.  He states that he really likes this medication.  Notices no side effect.       INTERPRETATION:   ________________________________________________________________________     This is abnormal routine video EEG recording in the awake and drowsy/sleep state(s).     This scalp EEG denotes                2. Prominant focal cortical dysfunction/irritabiliyt over frontal --frontal lobe subclinical seizures are likely     Advise Increase of seizure medication or even prolonged inpatient EEG monitoring.    No longer taking the Vimpat.  Onfi 10mg BID.    Current Outpatient Prescriptions   Medication Sig Dispense Refill   • lacosamide (VIMPAT) 200 MG Tab tablet Take 1/2 tablet po BID X 1 week then 1/2 tablet po qam and one tablet po qhs X 1 week then one tablet po BID thereafter. 60 Tab 5     No current facility-administered medications for this visit.            Review of Systems   HENT: Positive for congestion and sore throat. Negative for hearing loss and nosebleeds.         No recent head injury.   Eyes: Negative for double vision.        No new loss of vision.   Respiratory: Negative for cough.         No recent lung infections.   Cardiovascular: Negative for chest pain.   Gastrointestinal: Negative for abdominal pain, diarrhea, nausea and vomiting.   Genitourinary: Negative.    Musculoskeletal: Negative.    Skin: Negative.    Neurological: Negative for headaches.   Endo/Heme/Allergies:        No history of endocrine dysfunction.  No new problems.   Psychiatric/Behavioral: Negative for depression. The patient is nervous/anxious.         No recent mood changes.          Objective:     /88 (BP Location: Left arm,  "Patient Position: Sitting, BP Cuff Size: Small adult)   Pulse 97   Temp 36.9 °C (98.4 °F) (Temporal)   Ht 1.93 m (6' 4\")   Wt 79.4 kg (175 lb)   SpO2 97%   BMI 21.30 kg/m²      Physical Exam   Constitutional: He is oriented to person, place, and time. He appears well-developed and well-nourished. No distress.   HENT:   Head: Normocephalic and atraumatic.   Eyes: Pupils are equal, round, and reactive to light.   Neck: Normal range of motion.   Cardiovascular: Normal rate and regular rhythm.    Pulmonary/Chest: Effort normal and breath sounds normal. No respiratory distress.   Musculoskeletal: Normal range of motion.   Neurological: He is alert and oriented to person, place, and time. He has normal strength and normal reflexes. No cranial nerve deficit. He exhibits normal muscle tone. Coordination normal.   No observable changes in neurologic status.  See initial new patient examination for details.     Skin: Skin is warm and dry.   Psychiatric: His mood appears anxious.             Assessment/Plan:     Seizure Disorder:  Has most recently tried and failed Zonegran and Vimpat.  Only tolerating Onfi 10mg BID at this time.  He clearly understands that Onfi is not indicated for monotherapy.    7/2018: This scalp EEG denotes                2. Prominant focal cortical dysfunction/irritabiliyt over frontal --frontal lobe subclinical seizures are likely     Advise Increase of seizure medication or even prolonged inpatient EEG monitoring.    Would like to proceed with EMU visit.    He is not to drive during this time.  Discussed general safety including water and cooking. Discussed SUDEP.     Return for follow-up after EMU visit.      EDUCATION AND COUNSELING:  -Education was provided to the patient and/or family regarding diagnosis and prognosis. The chronic and unpredictable nature of the condition was discussed. There is increased risk for additional events, which may carry potential for significant injuries and " death.    -We reviewed the current antiepileptic regimen. Potential side effects of antiepileptics were discussed at length, including but no limited to: hypersensitivity reactions (rash and others, some of which can be fatal), visual field changes (some of which may be irreversible), glaucoma, diplopia, kidney stones, osteopenia/osteoporosis/bone fractures, hyperthermia/anhydrosis, tremors, ataxia, dizziness, fatigue, increased risk for falls, cardiac arrhythmias/syncope, gastrointestinal (hepatitis, pancreatitis, gastritis, ulcers), gingival hypertrophy, drowsiness, sedation, anxiety/nervousness, increased risk for suicide, increased risk for depression, and psychosis. We reviewed drug-drug interactions and their potential effect on seizure control and medication side effects.    -Patient/family educated on SUDEP (Sudden Death in Epilepsy). Counseling was provided on the importance of strict medication and follow up compliance. The patient understands the risks associated with non-adherence with the medical plan as outlined, including but not limited to an increased risk for breakthrough seizures, which may contribute to injuries, disability, status epilepticus, and even death.    -Counseling was also provided on potential effects of alcohol and other drugs, which may lower seizure threshold and/or affect the metabolism of antiepileptic drugs. I recommend avoidance of alcohol and illegal drugs.  -Recommend proper hydration, regular exercise, proper sleep hygiene (7-8 hrs of overnight sleep, avoid sleep deprivation).    -I have made the patient aware of mandatory reporting required by the law in the State of [place] regarding episodes of seizures, loss of consciousness, and/or alteration of awareness. The patient and family are responsible for reporting events to the DMV, instructions were provided. The patient verbalized understanding and states he has not been driving. Other seizure precautions were discussed at  length, including no diving, no skydiving, no unsupervised swimming, no Jacuzzi or bathing in bathtubs.    Pt agrees with plan.

## 2018-10-24 ENCOUNTER — TELEPHONE (OUTPATIENT)
Dept: NEUROLOGY | Facility: MEDICAL CENTER | Age: 23
End: 2018-10-24

## 2018-11-03 ENCOUNTER — APPOINTMENT (OUTPATIENT)
Dept: RADIOLOGY | Facility: MEDICAL CENTER | Age: 23
End: 2018-11-03
Attending: EMERGENCY MEDICINE
Payer: COMMERCIAL

## 2018-11-03 ENCOUNTER — HOSPITAL ENCOUNTER (EMERGENCY)
Facility: MEDICAL CENTER | Age: 23
End: 2018-11-03
Attending: EMERGENCY MEDICINE
Payer: COMMERCIAL

## 2018-11-03 DIAGNOSIS — Z91.148 NONCOMPLIANCE WITH MEDICATION REGIMEN: ICD-10-CM

## 2018-11-03 DIAGNOSIS — S09.90XA INJURY OF HEAD, INITIAL ENCOUNTER: ICD-10-CM

## 2018-11-03 DIAGNOSIS — R56.9 SEIZURE (HCC): ICD-10-CM

## 2018-11-03 LAB
ALBUMIN SERPL BCP-MCNC: 5.4 G/DL (ref 3.2–4.9)
ALBUMIN/GLOB SERPL: 1.9 G/DL
ALP SERPL-CCNC: 70 U/L (ref 30–99)
ALT SERPL-CCNC: 15 U/L (ref 2–50)
ANION GAP SERPL CALC-SCNC: 16 MMOL/L (ref 0–11.9)
AST SERPL-CCNC: 40 U/L (ref 12–45)
BASOPHILS # BLD AUTO: 0.2 % (ref 0–1.8)
BASOPHILS # BLD: 0.03 K/UL (ref 0–0.12)
BILIRUB SERPL-MCNC: 1.2 MG/DL (ref 0.1–1.5)
BUN SERPL-MCNC: 14 MG/DL (ref 8–22)
CALCIUM SERPL-MCNC: 9.9 MG/DL (ref 8.5–10.5)
CHLORIDE SERPL-SCNC: 104 MMOL/L (ref 96–112)
CO2 SERPL-SCNC: 17 MMOL/L (ref 20–33)
CREAT SERPL-MCNC: 0.97 MG/DL (ref 0.5–1.4)
EOSINOPHIL # BLD AUTO: 0 K/UL (ref 0–0.51)
EOSINOPHIL NFR BLD: 0 % (ref 0–6.9)
ERYTHROCYTE [DISTWIDTH] IN BLOOD BY AUTOMATED COUNT: 41.6 FL (ref 35.9–50)
ETHANOL BLD-MCNC: 0 G/DL
GLOBULIN SER CALC-MCNC: 2.8 G/DL (ref 1.9–3.5)
GLUCOSE SERPL-MCNC: 103 MG/DL (ref 65–99)
HCT VFR BLD AUTO: 44.8 % (ref 42–52)
HGB BLD-MCNC: 16.1 G/DL (ref 14–18)
IMM GRANULOCYTES # BLD AUTO: 0.09 K/UL (ref 0–0.11)
IMM GRANULOCYTES NFR BLD AUTO: 0.5 % (ref 0–0.9)
LYMPHOCYTES # BLD AUTO: 0.74 K/UL (ref 1–4.8)
LYMPHOCYTES NFR BLD: 3.8 % (ref 22–41)
MCH RBC QN AUTO: 31.8 PG (ref 27–33)
MCHC RBC AUTO-ENTMCNC: 35.9 G/DL (ref 33.7–35.3)
MCV RBC AUTO: 88.5 FL (ref 81.4–97.8)
MONOCYTES # BLD AUTO: 1.43 K/UL (ref 0–0.85)
MONOCYTES NFR BLD AUTO: 7.3 % (ref 0–13.4)
NEUTROPHILS # BLD AUTO: 17.24 K/UL (ref 1.82–7.42)
NEUTROPHILS NFR BLD: 88.2 % (ref 44–72)
NRBC # BLD AUTO: 0 K/UL
NRBC BLD-RTO: 0 /100 WBC
PLATELET # BLD AUTO: 161 K/UL (ref 164–446)
PMV BLD AUTO: 12.6 FL (ref 9–12.9)
POTASSIUM SERPL-SCNC: 4 MMOL/L (ref 3.6–5.5)
PROT SERPL-MCNC: 8.2 G/DL (ref 6–8.2)
RBC # BLD AUTO: 5.06 M/UL (ref 4.7–6.1)
SODIUM SERPL-SCNC: 137 MMOL/L (ref 135–145)
WBC # BLD AUTO: 19.5 K/UL (ref 4.8–10.8)

## 2018-11-03 PROCEDURE — 80053 COMPREHEN METABOLIC PANEL: CPT

## 2018-11-03 PROCEDURE — 96375 TX/PRO/DX INJ NEW DRUG ADDON: CPT

## 2018-11-03 PROCEDURE — 700111 HCHG RX REV CODE 636 W/ 250 OVERRIDE (IP): Performed by: EMERGENCY MEDICINE

## 2018-11-03 PROCEDURE — 85025 COMPLETE CBC W/AUTO DIFF WBC: CPT

## 2018-11-03 PROCEDURE — 80307 DRUG TEST PRSMV CHEM ANLYZR: CPT

## 2018-11-03 PROCEDURE — 70450 CT HEAD/BRAIN W/O DYE: CPT

## 2018-11-03 PROCEDURE — 96374 THER/PROPH/DIAG INJ IV PUSH: CPT

## 2018-11-03 PROCEDURE — 36415 COLL VENOUS BLD VENIPUNCTURE: CPT

## 2018-11-03 PROCEDURE — 700111 HCHG RX REV CODE 636 W/ 250 OVERRIDE (IP)

## 2018-11-03 PROCEDURE — 99284 EMERGENCY DEPT VISIT MOD MDM: CPT

## 2018-11-03 RX ORDER — ONDANSETRON 2 MG/ML
4 INJECTION INTRAMUSCULAR; INTRAVENOUS ONCE
Status: COMPLETED | OUTPATIENT
Start: 2018-11-03 | End: 2018-11-03

## 2018-11-03 RX ORDER — LORAZEPAM 2 MG/ML
1 INJECTION INTRAMUSCULAR ONCE
Status: COMPLETED | OUTPATIENT
Start: 2018-11-03 | End: 2018-11-03

## 2018-11-03 RX ADMIN — ONDANSETRON 4 MG: 2 INJECTION INTRAMUSCULAR; INTRAVENOUS at 12:06

## 2018-11-03 RX ADMIN — LORAZEPAM 1 MG: 2 INJECTION INTRAMUSCULAR; INTRAVENOUS at 12:15

## 2018-11-03 ASSESSMENT — PAIN SCALES - GENERAL: PAINLEVEL_OUTOF10: 0

## 2018-11-03 NOTE — ED TRIAGE NOTES
Pt BIB EMS after having a witnessed seizure by family that was described as tonic-clonic for 5 minutes. Pt has a history of seizures medicated with a benzodiazepine. Unknown compliance. Empty alcohol bottles found around patient per EMS and unknown drug use. Pt was also incontinent of urine, otherwise head-to-toe was unremarkable.  The pt was given 2mg IV versed for acute agitation while on scene. Patient placed in restraints due to agitation and trying to pull out IV. Soft restraints placed and side railes padded for pt in ED for patient safety. Patient up for ERP.

## 2018-11-03 NOTE — ED NOTES
"Pt rounded on and found to be AOx4 with mild delay in answering questions, otherwise alert and oriented x 4. Pt's grandmother at bedside. Pt states he has not taken his medications for 3-4 weeks because \"I can't remember to take it.\"   Pt states he normally drinks 5-6 beers on the weekend and smokes marijuana on a weekly basis. Denies any other drugs. Pt and grandmother updated on POC.  "

## 2018-11-03 NOTE — ED PROVIDER NOTES
ED Provider Note    CHIEF COMPLAINT  Chief Complaint   Patient presents with   • Seizure       HPI  Ezio Israel is a 23 y.o. male who presents with complaint of seizure.  His grandmother who he lives with states patient is not taking his seizure medication over the past 1 month.  Per the grandmother, he had been using marijuana over the past day.  No known history of methamphetamine use.  He has right facial trauma, forehead and right cheek bone from the fall and seizure.  No tongue bite.  No urinary incontinence.  Patient has a known history of seizures had done well this month and previously after starting a new seizure medication.  Patient denies alcohol use today.  No vision change, no acute numbness or weakness.  Patient initially combative and had required soft restraints on transport.  Patient had been admitted for seizures in July of this year, workup showed on MRI hemorrhagic contusion.    REVIEW OF SYSTEMS  Ear nose throat: Right facial trauma  Respiratory: No shortness of breath or cough  Gastrointestinal: No vomiting or abdominal pain  Musculoskeletal: No neck pain  Neurologic: Headache, seizure today  Skin: Small abrasion of the face     All other systems are negative.       PAST MEDICAL HISTORY  Past Medical History:   Diagnosis Date   • Seizure disorder (HCC)    • Substance abuse (HCC)        FAMILY HISTORY  Family History   Problem Relation Age of Onset   • Stroke Mother 46   • Cancer Maternal Grandmother         Breast cancer    • Seizures Neg Hx    • Diabetes Neg Hx        SOCIAL HISTORY  Social History     Social History   • Marital status: Single     Spouse name: N/A   • Number of children: N/A   • Years of education: N/A     Social History Main Topics   • Smoking status: Current Every Day Smoker     Packs/day: 0.50     Years: 10.00     Types: Cigarettes   • Smokeless tobacco: Never Used      Comment: tried nicotine patch but did not help.    • Alcohol use Yes      Comment: occ   • Drug  "use: Yes     Frequency: 14.0 times per week     Types: Inhaled, Marijuana      Comment: marijuana    • Sexual activity: Yes     Partners: Female     Birth control/ protection: Condom     Other Topics Concern   • Not on file     Social History Narrative   • No narrative on file       SURGICAL HISTORY  Past Surgical History:   Procedure Laterality Date   • WRIST FUSION Left     Patient reports left wrist surgery at age 2       CURRENT MEDICATIONS  No current facility-administered medications on file prior to encounter.      Current Outpatient Prescriptions on File Prior to Encounter   Medication Sig Dispense Refill   • CloBAZam (ONFI) 20 MG Tab Take 10 mg by mouth every bedtime for 30 days. Then increase to 20mg po qhs thereafter 30 Tab 5       ALLERGIES  Allergies   Allergen Reactions   • Penicillins Hives   • Augmentin    • Lortab [Hydrocodone-Acetaminophen] Rash   • Vimpat Hives       PHYSICAL EXAM  VITAL SIGNS: BP (P) 129/88   Pulse 83   Temp 37 °C (98.6 °F)   Resp 19   Ht 1.88 m (6' 2\")   Wt 81.6 kg (180 lb)   SpO2 100%   BMI 23.11 kg/m²    Constitutional: Well-nourished, no distress  HENT: Contusion right forehead, small abrasion right cheek bone.  No bony crepitance or deformity to the facial bones.  No nasal bone tenderness.  Eyes: Pupils are equal 3 millimeters, Conjunctiva normal, No discharge.  No nystagmus  Neck: Nontender, range of motion without pain or stiffness  Cardiovascular: Normal heart rate, Normal rhythm   Pulmonary: Equal  breath sounds, No wheezing or rales.  Normal air movement  GI: Soft and nontender.  No distention  Skin: Facial contusion/abrasion  Vascular: Normal capillary refill all extremities  Musculoskeletal: Spine nontender, ribs and extremities nontender.  Neurologic: Sensation and strength intact.  Speech is clear.  Facial expression symmetric.  Patient is alert, currently cooperative and oriented    RADIOLOGY/PROCEDURES  CT-HEAD W/O   Final Result      No acute intracranial " abnormality is identified.            COURSE & MEDICAL DECISION MAKING  Pertinent Labs & Imaging studies reviewed. (See chart for details)  Patient given a dose of Ativan, remains neurologically intact without further seizure activity.  His CT scan of his head is negative for acute bleed.  Patient is advised to take his medication as prescribed every day, he will continue to take his medication with dose tonight as well as again tomorrow morning resuming normal scheduling.  Patient is advised to follow-up as scheduled, his neurologist has apparently scheduled inpatient EEG later this week.  He is discharged in stable condition    FINAL IMPRESSION     1. Seizure (HCC)    2. Injury of head, initial encounter    3. Noncompliance with medication regimen              Electronically signed by: Loki Justin, 11/4/2018

## 2018-11-03 NOTE — ED NOTES
Pt medicated per MAR, updated on POC. Resting comfortably with grandmother at bedside. No seizure activity noted.

## 2018-11-03 NOTE — ED NOTES
All discharge instructions given to pt .  Pt advised not to drink or drive .  Pt verbalized understanding of all discharge instructions. All lines removed prior to discharge. All questions answered.

## 2018-11-06 ENCOUNTER — TELEPHONE (OUTPATIENT)
Dept: NEUROLOGY | Facility: MEDICAL CENTER | Age: 23
End: 2018-11-06

## 2018-11-06 NOTE — TELEPHONE ENCOUNTER
Patient called to report a seizure.  He was in the ED November 3rd    When change was noticed-  11/03/18 morning @ home in bed. (Time unknown. Patient sustained a concussion)   Frequency- Unknown  Type of seizure- Unwitnessed - grandparents found him afterwards.    Current Medications-     CloBAZam (ONFI) 20 MG Tab 30 Tab 5/5 10/16/2018 11/15/2018    Sig - Route: Take 10 mg by mouth every bedtime for 30 days. Then increase to 20mg po qhs thereafter       LORazepam (ATIVAN) 1 MG Tab 20 Tab 0/0 7/24/2018 8/24/2018    Sig: Take 1/2-1 tablet PO PRN breakthrough seizures.  Do not exceed more than 2 tablets in 24 hours unless directed otherwise by physician.        Any Medication changes?-  no Date- na    Illness/Fever? Allergies but not sick  Menses? na  Stress? No  Sleep Deprivation? No  Driving? No    Caller: Ezio   Call Back #: [phone]  OK to leave detailed message: yes

## 2018-11-09 ENCOUNTER — TELEPHONE (OUTPATIENT)
Dept: NEUROLOGY | Facility: MEDICAL CENTER | Age: 23
End: 2018-11-09

## 2018-11-09 NOTE — TELEPHONE ENCOUNTER
Called pt to advise him that Monday 11/12/18 admission to EMU has not been approved and I will follow up on this matter and reschedule dates once insurance approves or denies.

## 2018-11-13 ENCOUNTER — TELEPHONE (OUTPATIENT)
Dept: NEUROLOGY | Facility: MEDICAL CENTER | Age: 23
End: 2018-11-13

## 2018-11-16 ENCOUNTER — TELEPHONE (OUTPATIENT)
Dept: NEUROLOGY | Facility: MEDICAL CENTER | Age: 23
End: 2018-11-16

## 2018-11-16 VITALS
RESPIRATION RATE: 19 BRPM | HEIGHT: 74 IN | DIASTOLIC BLOOD PRESSURE: 88 MMHG | TEMPERATURE: 98.6 F | SYSTOLIC BLOOD PRESSURE: 129 MMHG | OXYGEN SATURATION: 100 % | BODY MASS INDEX: 23.1 KG/M2 | WEIGHT: 180 LBS | HEART RATE: 83 BPM

## 2018-11-16 NOTE — TELEPHONE ENCOUNTER
Patient left a voicemail asking for a phone call back to discuss some questions that he had. Attempted to call patient back however, his voicemail box is not set up on his cell phone - thus I am unable to leave a message. Tried calling his house phone - this number does not seem to work either as it simply told me that the call had been rejected and hung up on me. I will attempt calling the patient back again later.

## 2019-01-28 ENCOUNTER — TELEPHONE (OUTPATIENT)
Dept: NEUROLOGY | Facility: MEDICAL CENTER | Age: 24
End: 2019-01-28

## 2019-01-28 ENCOUNTER — HOSPITAL ENCOUNTER (OUTPATIENT)
Facility: MEDICAL CENTER | Age: 24
End: 2019-01-28

## 2019-01-28 NOTE — TELEPHONE ENCOUNTER
Called and left VM on both number to see if pt was still able to make it today to admit. NO answer- left direct line to call

## 2019-08-29 ENCOUNTER — HOSPITAL ENCOUNTER (EMERGENCY)
Facility: MEDICAL CENTER | Age: 24
End: 2019-08-29
Attending: EMERGENCY MEDICINE
Payer: COMMERCIAL

## 2019-08-29 VITALS
RESPIRATION RATE: 16 BRPM | WEIGHT: 180 LBS | DIASTOLIC BLOOD PRESSURE: 65 MMHG | OXYGEN SATURATION: 97 % | HEART RATE: 53 BPM | TEMPERATURE: 97.5 F | HEIGHT: 74 IN | SYSTOLIC BLOOD PRESSURE: 104 MMHG | BODY MASS INDEX: 23.1 KG/M2

## 2019-08-29 DIAGNOSIS — S00.83XA FOREHEAD CONTUSION, INITIAL ENCOUNTER: ICD-10-CM

## 2019-08-29 DIAGNOSIS — R56.9 SEIZURE (HCC): ICD-10-CM

## 2019-08-29 LAB
ALBUMIN SERPL BCP-MCNC: 5 G/DL (ref 3.2–4.9)
ALBUMIN/GLOB SERPL: 1.7 G/DL
ALP SERPL-CCNC: 69 U/L (ref 30–99)
ALT SERPL-CCNC: 14 U/L (ref 2–50)
ANION GAP SERPL CALC-SCNC: 9 MMOL/L (ref 0–11.9)
AST SERPL-CCNC: 21 U/L (ref 12–45)
BASOPHILS # BLD AUTO: 0.4 % (ref 0–1.8)
BASOPHILS # BLD: 0.02 K/UL (ref 0–0.12)
BILIRUB SERPL-MCNC: 1.3 MG/DL (ref 0.1–1.5)
BUN SERPL-MCNC: 14 MG/DL (ref 8–22)
CALCIUM SERPL-MCNC: 9.8 MG/DL (ref 8.5–10.5)
CHLORIDE SERPL-SCNC: 106 MMOL/L (ref 96–112)
CO2 SERPL-SCNC: 23 MMOL/L (ref 20–33)
CREAT SERPL-MCNC: 1.02 MG/DL (ref 0.5–1.4)
EOSINOPHIL # BLD AUTO: 0.05 K/UL (ref 0–0.51)
EOSINOPHIL NFR BLD: 1 % (ref 0–6.9)
ERYTHROCYTE [DISTWIDTH] IN BLOOD BY AUTOMATED COUNT: 40.1 FL (ref 35.9–50)
ETHANOL BLD-MCNC: 0 G/DL
GLOBULIN SER CALC-MCNC: 2.9 G/DL (ref 1.9–3.5)
GLUCOSE SERPL-MCNC: 85 MG/DL (ref 65–99)
HCT VFR BLD AUTO: 48.8 % (ref 42–52)
HGB BLD-MCNC: 16.9 G/DL (ref 14–18)
IMM GRANULOCYTES # BLD AUTO: 0.02 K/UL (ref 0–0.11)
IMM GRANULOCYTES NFR BLD AUTO: 0.4 % (ref 0–0.9)
LYMPHOCYTES # BLD AUTO: 0.97 K/UL (ref 1–4.8)
LYMPHOCYTES NFR BLD: 18.9 % (ref 22–41)
MCH RBC QN AUTO: 30.9 PG (ref 27–33)
MCHC RBC AUTO-ENTMCNC: 34.6 G/DL (ref 33.7–35.3)
MCV RBC AUTO: 89.2 FL (ref 81.4–97.8)
MONOCYTES # BLD AUTO: 0.49 K/UL (ref 0–0.85)
MONOCYTES NFR BLD AUTO: 9.6 % (ref 0–13.4)
NEUTROPHILS # BLD AUTO: 3.58 K/UL (ref 1.82–7.42)
NEUTROPHILS NFR BLD: 69.7 % (ref 44–72)
NRBC # BLD AUTO: 0 K/UL
NRBC BLD-RTO: 0 /100 WBC
PLATELET # BLD AUTO: 140 K/UL (ref 164–446)
PMV BLD AUTO: 12.4 FL (ref 9–12.9)
POTASSIUM SERPL-SCNC: 4 MMOL/L (ref 3.6–5.5)
PROT SERPL-MCNC: 7.9 G/DL (ref 6–8.2)
RBC # BLD AUTO: 5.47 M/UL (ref 4.7–6.1)
SODIUM SERPL-SCNC: 138 MMOL/L (ref 135–145)
WBC # BLD AUTO: 5.1 K/UL (ref 4.8–10.8)

## 2019-08-29 PROCEDURE — 80307 DRUG TEST PRSMV CHEM ANLYZR: CPT

## 2019-08-29 PROCEDURE — 700111 HCHG RX REV CODE 636 W/ 250 OVERRIDE (IP): Performed by: EMERGENCY MEDICINE

## 2019-08-29 PROCEDURE — 85025 COMPLETE CBC W/AUTO DIFF WBC: CPT

## 2019-08-29 PROCEDURE — 80053 COMPREHEN METABOLIC PANEL: CPT

## 2019-08-29 PROCEDURE — A9270 NON-COVERED ITEM OR SERVICE: HCPCS | Performed by: EMERGENCY MEDICINE

## 2019-08-29 PROCEDURE — 99284 EMERGENCY DEPT VISIT MOD MDM: CPT

## 2019-08-29 PROCEDURE — 700102 HCHG RX REV CODE 250 W/ 637 OVERRIDE(OP): Performed by: EMERGENCY MEDICINE

## 2019-08-29 RX ORDER — LORAZEPAM 2 MG/ML
1 INJECTION INTRAMUSCULAR ONCE
Status: COMPLETED | OUTPATIENT
Start: 2019-08-29 | End: 2019-08-29

## 2019-08-29 RX ORDER — CLOBAZAM 10 MG/1
10 TABLET ORAL DAILY
Qty: 30 TAB | Refills: 0 | Status: SHIPPED | OUTPATIENT
Start: 2019-08-29 | End: 2019-09-28

## 2019-08-29 RX ORDER — CLOBAZAM 10 MG/1
10 TABLET ORAL ONCE
Status: COMPLETED | OUTPATIENT
Start: 2019-08-29 | End: 2019-08-29

## 2019-08-29 RX ADMIN — LORAZEPAM 1 MG: 2 INJECTION INTRAMUSCULAR; INTRAVENOUS at 09:09

## 2019-08-29 RX ADMIN — CLOBAZAM 10 MG: 10 TABLET ORAL at 09:09

## 2019-08-29 SDOH — HEALTH STABILITY: MENTAL HEALTH: HOW OFTEN DO YOU HAVE A DRINK CONTAINING ALCOHOL?: 4 OR MORE TIMES A WEEK

## 2019-08-29 NOTE — ED NOTES
Pt provided with discharge instructions, prescriptions x1, instructions for follow up appointment with PCP, s/s of when to seek emergency care.  Pt verbalizes understanding.  Pt discharged in good condition with Grandfather.  Pt instructed not to operate vehicle.

## 2019-08-29 NOTE — ED PROVIDER NOTES
ED Provider Note    Scribed for Breanna Pascual M.D. by Micha Marcus. 8/29/2019  8:54 AM    Primary care provider: Jose Antonio Rodriguez M.D.  Means of arrival: EMS  History obtained from: patient  History limited by: none    CHIEF COMPLAINT  Chief Complaint   Patient presents with   • Seizure   • Head Injury       HPI  Ezio Israel is a 24 y.o. male with a history of seizures who presents to the Emergency Department complaining of head pain status post fall from his bed last night. Patient reports associated hematoma to left forehead. He states that his seizures are usually precipitated by arm numbness, and states that he felt as if a seizure were coming on last night. Patient went to bed and slept through the night until he was found this morning on the floor by his grandmother after apparent seizure while sleeping and fall from his bed. He is prescribed clobazam but has been out of this medication for a several weeks. He reports a history of alcohol use, cigarette use, marijuana use. Patient denies neck pain, oral trauma, numbness, focal weakness.      REVIEW OF SYSTEMS  HEENT:  No ear pain, congestion, or sore throat   EYES: no discharge, redness, or vision changes  CARDIAC: no chest pain, no palpitations    PULMONARY: no dyspnea, cough, or congestion   GI: no vomiting, diarrhea, or abdominal pain   : no dysuria, back pain, or hematuria   Neuro: Seizure. no weakness, numbness, aphasia  Musculoskeletal: Fall, head pain, hematoma. no joint swelling  Endocrine: no fevers, sweating, or weight loss   SKIN: no rash, erythema, or contusions     See history of present illness. All other systems are negative. C.    PAST MEDICAL HISTORY   has a past medical history of Seizure disorder (HCC) and Substance abuse (HCC).    SURGICAL HISTORY   has a past surgical history that includes wrist fusion (Left).    SOCIAL HISTORY  Social History     Tobacco Use   • Smoking status: Current Every Day Smoker     Packs/day: 0.50  "    Years: 10.00     Pack years: 5.00     Types: Cigarettes   • Smokeless tobacco: Never Used   • Tobacco comment: tried nicotine patch but did not help.    Substance Use Topics   • Alcohol use: Yes     Frequency: 4 or more times a week     Comment: occ   • Drug use: Yes     Frequency: 14.0 times per week     Types: Inhaled, Marijuana     Comment: marijuana       Social History     Substance and Sexual Activity   Drug Use Yes   • Frequency: 14.0 times per week   • Types: Inhaled, Marijuana    Comment: marijuana        FAMILY HISTORY  Family History   Problem Relation Age of Onset   • Stroke Mother 46   • Cancer Maternal Grandmother         Breast cancer    • Seizures Neg Hx    • Diabetes Neg Hx        CURRENT MEDICATIONS  Home Medications     Reviewed by Analia Lamas (Pharmacy Tech) on 08/29/19 at 1020  Med List Status: Complete   Medication Last Dose Status        Patient Ronni Taking any Medications                       ALLERGIES  Allergies   Allergen Reactions   • Penicillins Hives   • Augmentin    • Lortab [Hydrocodone-Acetaminophen] Rash   • Vimpat Hives       PHYSICAL EXAM  VITAL SIGNS: /75   Pulse 63   Temp 36.4 °C (97.5 °F) (Temporal)   Resp 16   Ht 1.88 m (6' 2\")   Wt 81.6 kg (180 lb)   SpO2 100%   BMI 23.11 kg/m²     Constitutional: Well developed, Well nourished, No acute distress, Non-toxic appearance.   HEENT: Normocephalic, external ears normal, pharynx pink,  Mucous  Membranes moist, No rhinorrhea or mucosal edema. Left frontal scalp with quarter sized hematoma. No step offs or bony tenderness.   Eyes: PERRL, EOMI, Conjunctiva normal, No discharge.   Neck: Normal range of motion, No tenderness, Supple, No stridor.   Lymphatic: No lymphadenopathy    Cardiovascular: Regular Rate and Rhythm, No murmurs,  rubs, or gallops.   Thorax & Lungs: Lungs clear to auscultation bilaterally, No respiratory distress, No wheezes, rhales or rhonchi, No chest wall tenderness.   Abdomen: Bowel sounds " normal, Soft, non tender, non distended,  No pulsatile masses., no rebound guarding or peritoneal signs.   Skin: Warm, Dry, No erythema, No rash,   Back:  No CVA tenderness,  No spinal tenderness, bony crepitance, step offs, or instability.   Neurologic: Alert & oriented x 3, Normal motor function, Normal sensory function, No focal deficits noted. Normal reflexes. Normal Cranial Nerves. Normal speech.   Extremities: Equal, intact distal pulses, No cyanosis, clubbing or edema,  No tenderness.   Musculoskeletal: Good range of motion in all major joints. No tenderness to palpation or major deformities noted.     DIAGNOSTIC STUDIES / PROCEDURES    LABS  Results for orders placed or performed during the hospital encounter of 08/29/19   CBC WITH DIFFERENTIAL   Result Value Ref Range    WBC 5.1 4.8 - 10.8 K/uL    RBC 5.47 4.70 - 6.10 M/uL    Hemoglobin 16.9 14.0 - 18.0 g/dL    Hematocrit 48.8 42.0 - 52.0 %    MCV 89.2 81.4 - 97.8 fL    MCH 30.9 27.0 - 33.0 pg    MCHC 34.6 33.7 - 35.3 g/dL    RDW 40.1 35.9 - 50.0 fL    Platelet Count 140 (L) 164 - 446 K/uL    MPV 12.4 9.0 - 12.9 fL    Neutrophils-Polys 69.70 44.00 - 72.00 %    Lymphocytes 18.90 (L) 22.00 - 41.00 %    Monocytes 9.60 0.00 - 13.40 %    Eosinophils 1.00 0.00 - 6.90 %    Basophils 0.40 0.00 - 1.80 %    Immature Granulocytes 0.40 0.00 - 0.90 %    Nucleated RBC 0.00 /100 WBC    Neutrophils (Absolute) 3.58 1.82 - 7.42 K/uL    Lymphs (Absolute) 0.97 (L) 1.00 - 4.80 K/uL    Monos (Absolute) 0.49 0.00 - 0.85 K/uL    Eos (Absolute) 0.05 0.00 - 0.51 K/uL    Baso (Absolute) 0.02 0.00 - 0.12 K/uL    Immature Granulocytes (abs) 0.02 0.00 - 0.11 K/uL    NRBC (Absolute) 0.00 K/uL   Comp Metabolic Panel   Result Value Ref Range    Sodium 138 135 - 145 mmol/L    Potassium 4.0 3.6 - 5.5 mmol/L    Chloride 106 96 - 112 mmol/L    Co2 23 20 - 33 mmol/L    Anion Gap 9.0 0.0 - 11.9    Glucose 85 65 - 99 mg/dL    Bun 14 8 - 22 mg/dL    Creatinine 1.02 0.50 - 1.40 mg/dL    Calcium 9.8  "8.5 - 10.5 mg/dL    AST(SGOT) 21 12 - 45 U/L    ALT(SGPT) 14 2 - 50 U/L    Alkaline Phosphatase 69 30 - 99 U/L    Total Bilirubin 1.3 0.1 - 1.5 mg/dL    Albumin 5.0 (H) 3.2 - 4.9 g/dL    Total Protein 7.9 6.0 - 8.2 g/dL    Globulin 2.9 1.9 - 3.5 g/dL    A-G Ratio 1.7 g/dL   DIAGNOSTIC ALCOHOL   Result Value Ref Range    Diagnostic Alcohol 0.00 0.00 g/dL   ESTIMATED GFR   Result Value Ref Range    GFR If African American >60 >60 mL/min/1.73 m 2    GFR If Non African American >60 >60 mL/min/1.73 m 2   All labs reviewed by me.    COURSE & MEDICAL DECISION MAKING  Nursing notes, VS, PMSFHx reviewed in chart.    8:54 AM - Patient seen and examined at bedside. He dose not meet head CT criteria at this time. Discussed obtaining basic labs to evaluate and treatment with a dose of his medication. Patient verbalizes understanding and agreement to this plan of care.  Patient will be treated with Ativan 1 mg, Clobazam 10 mg. Ordered Urine drug screen, Diagnostic alcohol, CBC with differential, CMP to evaluate his symptoms. The differential diagnoses include but are not limited to: breakthrough seizures due to medication non compliance, contusion, doubt fracture    10:25 AM - Recheck: Patient re-evaluated at beside. Patient reports feeling improved with interventions. He was sleeping comfortably and easily rousable. CMS is intact. Patient's diagnostic results discussed. Discussed patient's condition and treatment plan. Patient will be discharged with instructions and provided with strict return precautions. Patient will be sent home with a prescription for Clobazam. Advised to follow up with his neurologist. Patient is instructd not to drive until he follows up. Instructed to return to Emergency Department immediately if any new or worsening symptoms.    Discharge vitals: /65   Pulse (!) 53   Temp 36.4 °C (97.5 °F) (Temporal)   Resp 16   Ht 1.88 m (6' 2\")   Wt 81.6 kg (180 lb)   SpO2 97%   BMI 23.11 kg/m²     The " patient will return for new or worsening symptoms and is stable at the time of discharge.    The patient is referred to a primary physician for blood pressure management, diabetic screening, and for all other preventative health concerns.    DISPOSITION:  Patient will be discharged home in stable condition.    FOLLOW UP:  PUJA Wilson  [address] [address]  [place] [id number] [phone]    Call in 1 day  for recheck, to establish care      OUTPATIENT MEDICATIONS:  Discharge Medication List as of 8/29/2019 10:42 AM      START taking these medications    Details   clobazam (ONFI) 10 MG Tab tablet Take 1 Tab by mouth every day for 30 days., Disp-30 Tab, R-0, Print Rx Paper, For 30 days           FINAL IMPRESSION  1. Seizure (HCC)    2. Forehead contusion, initial encounter          Micha HERNANDEZ (Scribe), am scribing for, and in the presence of, Breanna Pascual M.D..    Electronically signed by: Micha Marcus (Kika[place]), 8/29/2019    IBreanna M.D. personally performed the services described in this documentation, as scribed by Micha Marcus in my presence, and it is both accurate and complete. C    The note accurately reflects work and decisions made by me.  Breanna Pascual  8/29/2019  2:42 PM

## 2019-08-29 NOTE — ED TRIAGE NOTES
Chief Complaint   Patient presents with   • Seizure   • Head Injury   Pt bib [facility] from home.  Pt reports he had a tonic clonic seizure while sleeping and rolled off his bed onto wood floor.  Pt believes seizure occurred around 5028-0626.  Pt was found by grandmother.  Pt has hematoma to left forehead.  Pt A&Ox4, PERRL.  Pt reports increased stress and that he ran out of seizure medication Clobazam several weeks ago.  FSBS 126 per EMS.

## 2019-08-29 NOTE — ED NOTES
Med Rec completed per patient and home pharmacy ([facility])  Allergies reviewed  No ORAL antibiotics in last 14 days    Patient has not filled his medications since 2018  Clobazam 20 mg: nightly  Lorazepam 1 m/2-1 tab daily as needed for breakthrough seizures

## 2023-08-04 ENCOUNTER — APPOINTMENT (OUTPATIENT)
Dept: RADIOLOGY | Facility: MEDICAL CENTER | Age: 28
End: 2023-08-04
Attending: EMERGENCY MEDICINE

## 2023-08-04 ENCOUNTER — HOSPITAL ENCOUNTER (EMERGENCY)
Facility: MEDICAL CENTER | Age: 28
End: 2023-08-04
Attending: EMERGENCY MEDICINE

## 2023-08-04 VITALS
SYSTOLIC BLOOD PRESSURE: 128 MMHG | WEIGHT: 186 LBS | TEMPERATURE: 97.5 F | OXYGEN SATURATION: 91 % | HEART RATE: 66 BPM | BODY MASS INDEX: 22.65 KG/M2 | RESPIRATION RATE: 22 BRPM | DIASTOLIC BLOOD PRESSURE: 76 MMHG | HEIGHT: 76 IN

## 2023-08-04 DIAGNOSIS — G40.909 SEIZURE DISORDER (HCC): ICD-10-CM

## 2023-08-04 PROCEDURE — 72125 CT NECK SPINE W/O DYE: CPT

## 2023-08-04 PROCEDURE — 700111 HCHG RX REV CODE 636 W/ 250 OVERRIDE (IP): Mod: JZ | Performed by: EMERGENCY MEDICINE

## 2023-08-04 PROCEDURE — 70450 CT HEAD/BRAIN W/O DYE: CPT

## 2023-08-04 PROCEDURE — 96374 THER/PROPH/DIAG INJ IV PUSH: CPT

## 2023-08-04 PROCEDURE — 99284 EMERGENCY DEPT VISIT MOD MDM: CPT

## 2023-08-04 RX ORDER — LEVETIRACETAM 500 MG/5ML
30 INJECTION, SOLUTION, CONCENTRATE INTRAVENOUS EVERY 12 HOURS
Status: DISCONTINUED | OUTPATIENT
Start: 2023-08-04 | End: 2023-08-04

## 2023-08-04 RX ORDER — LEVETIRACETAM 500 MG/5ML
2500 INJECTION, SOLUTION, CONCENTRATE INTRAVENOUS ONCE
Status: COMPLETED | OUTPATIENT
Start: 2023-08-04 | End: 2023-08-04

## 2023-08-04 RX ORDER — LEVETIRACETAM 500 MG/1
500 TABLET ORAL 2 TIMES DAILY
Qty: 60 TABLET | Refills: 0 | Status: SHIPPED | OUTPATIENT
Start: 2023-08-04 | End: 2023-12-10

## 2023-08-04 RX ADMIN — LEVETIRACETAM 2500 MG: 100 INJECTION, SOLUTION, CONCENTRATE INTRAVENOUS at 04:15

## 2023-08-04 NOTE — ED NOTES
"Discharge instructions reviewed with patient/family.  Patient verbalizes understanding of follow up care, medication management and reasons to return to the ER. PIV Removed with no complications. /76   Pulse 66   Temp 36.4 °C (97.5 °F)   Resp (!) 22   Ht 1.93 m (6' 4\")   Wt 84.4 kg (186 lb)   SpO2 91%   BMI 22.64 kg/m²   "
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
## 2023-08-04 NOTE — ED TRIAGE NOTES
"Chief Complaint   Patient presents with    Seizure     Pt BIBA from the assisted following a seizure that lasted about 2 minutes per officers at group home. Patient did fall from his bunk about 3 feet from the ground. +Head strike, -thinners, +LOC. Patient was post-ictal for EMS.  Pt was seen at [facility] today for another seizure, and has 6 stiches in his head. Pt reports stiffness in neck.        BIB EMS to [facility], pt on monitor and in gown, labs drawn and sent. Pt consists of: Neck stiffness.    Medications given en route:None    Ht 1.93 m (6' 4\")   Wt 84.4 kg (186 lb)   BMI 22.64 kg/m²   "

## 2023-08-09 ENCOUNTER — HOSPITAL ENCOUNTER (EMERGENCY)
Facility: MEDICAL CENTER | Age: 28
End: 2023-08-09
Attending: STUDENT IN AN ORGANIZED HEALTH CARE EDUCATION/TRAINING PROGRAM

## 2023-08-09 ENCOUNTER — APPOINTMENT (OUTPATIENT)
Dept: RADIOLOGY | Facility: MEDICAL CENTER | Age: 28
End: 2023-08-09
Attending: STUDENT IN AN ORGANIZED HEALTH CARE EDUCATION/TRAINING PROGRAM

## 2023-08-09 VITALS
DIASTOLIC BLOOD PRESSURE: 78 MMHG | BODY MASS INDEX: 22.97 KG/M2 | WEIGHT: 188.71 LBS | RESPIRATION RATE: 17 BRPM | SYSTOLIC BLOOD PRESSURE: 105 MMHG | HEART RATE: 81 BPM | OXYGEN SATURATION: 98 % | TEMPERATURE: 97.3 F

## 2023-08-09 DIAGNOSIS — K52.9 COLITIS: ICD-10-CM

## 2023-08-09 LAB
ALBUMIN SERPL BCP-MCNC: 4.7 G/DL (ref 3.2–4.9)
ALBUMIN/GLOB SERPL: 1.6 G/DL
ALP SERPL-CCNC: 112 U/L (ref 30–99)
ALT SERPL-CCNC: 28 U/L (ref 2–50)
ANION GAP SERPL CALC-SCNC: 19 MMOL/L (ref 7–16)
AST SERPL-CCNC: 33 U/L (ref 12–45)
BASOPHILS # BLD AUTO: 0.4 % (ref 0–1.8)
BASOPHILS # BLD: 0.03 K/UL (ref 0–0.12)
BILIRUB SERPL-MCNC: 0.6 MG/DL (ref 0.1–1.5)
BUN SERPL-MCNC: 3 MG/DL (ref 8–22)
CALCIUM ALBUM COR SERPL-MCNC: 9.3 MG/DL (ref 8.5–10.5)
CALCIUM SERPL-MCNC: 9.9 MG/DL (ref 8.5–10.5)
CHLORIDE SERPL-SCNC: 98 MMOL/L (ref 96–112)
CO2 SERPL-SCNC: 21 MMOL/L (ref 20–33)
CREAT SERPL-MCNC: 0.86 MG/DL (ref 0.5–1.4)
EOSINOPHIL # BLD AUTO: 0.06 K/UL (ref 0–0.51)
EOSINOPHIL NFR BLD: 0.7 % (ref 0–6.9)
ERYTHROCYTE [DISTWIDTH] IN BLOOD BY AUTOMATED COUNT: 41.5 FL (ref 35.9–50)
FLUAV RNA SPEC QL NAA+PROBE: NEGATIVE
FLUBV RNA SPEC QL NAA+PROBE: NEGATIVE
GFR SERPLBLD CREATININE-BSD FMLA CKD-EPI: 119 ML/MIN/1.73 M 2
GLOBULIN SER CALC-MCNC: 2.9 G/DL (ref 1.9–3.5)
GLUCOSE SERPL-MCNC: 99 MG/DL (ref 65–99)
HCT VFR BLD AUTO: 46.5 % (ref 42–52)
HGB BLD-MCNC: 16.2 G/DL (ref 14–18)
IMM GRANULOCYTES # BLD AUTO: 0.02 K/UL (ref 0–0.11)
IMM GRANULOCYTES NFR BLD AUTO: 0.2 % (ref 0–0.9)
LIPASE SERPL-CCNC: 25 U/L (ref 11–82)
LYMPHOCYTES # BLD AUTO: 1.38 K/UL (ref 1–4.8)
LYMPHOCYTES NFR BLD: 16.5 % (ref 22–41)
MCH RBC QN AUTO: 30.9 PG (ref 27–33)
MCHC RBC AUTO-ENTMCNC: 34.8 G/DL (ref 32.3–36.5)
MCV RBC AUTO: 88.6 FL (ref 81.4–97.8)
MONOCYTES # BLD AUTO: 0.96 K/UL (ref 0–0.85)
MONOCYTES NFR BLD AUTO: 11.5 % (ref 0–13.4)
NEUTROPHILS # BLD AUTO: 5.91 K/UL (ref 1.82–7.42)
NEUTROPHILS NFR BLD: 70.7 % (ref 44–72)
NRBC # BLD AUTO: 0 K/UL
NRBC BLD-RTO: 0 /100 WBC (ref 0–0.2)
PLATELET # BLD AUTO: 176 K/UL (ref 164–446)
PMV BLD AUTO: 12.7 FL (ref 9–12.9)
POTASSIUM SERPL-SCNC: 3.5 MMOL/L (ref 3.6–5.5)
PROT SERPL-MCNC: 7.6 G/DL (ref 6–8.2)
RBC # BLD AUTO: 5.25 M/UL (ref 4.7–6.1)
RSV RNA SPEC QL NAA+PROBE: NEGATIVE
SARS-COV-2 RNA RESP QL NAA+PROBE: NOTDETECTED
SODIUM SERPL-SCNC: 138 MMOL/L (ref 135–145)
SPECIMEN SOURCE: NORMAL
WBC # BLD AUTO: 8.4 K/UL (ref 4.8–10.8)

## 2023-08-09 PROCEDURE — 700105 HCHG RX REV CODE 258: Mod: JZ | Performed by: STUDENT IN AN ORGANIZED HEALTH CARE EDUCATION/TRAINING PROGRAM

## 2023-08-09 PROCEDURE — A9270 NON-COVERED ITEM OR SERVICE: HCPCS | Performed by: STUDENT IN AN ORGANIZED HEALTH CARE EDUCATION/TRAINING PROGRAM

## 2023-08-09 PROCEDURE — 700117 HCHG RX CONTRAST REV CODE 255: Performed by: STUDENT IN AN ORGANIZED HEALTH CARE EDUCATION/TRAINING PROGRAM

## 2023-08-09 PROCEDURE — 74177 CT ABD & PELVIS W/CONTRAST: CPT

## 2023-08-09 PROCEDURE — 85025 COMPLETE CBC W/AUTO DIFF WBC: CPT

## 2023-08-09 PROCEDURE — 80053 COMPREHEN METABOLIC PANEL: CPT

## 2023-08-09 PROCEDURE — 99285 EMERGENCY DEPT VISIT HI MDM: CPT

## 2023-08-09 PROCEDURE — 96374 THER/PROPH/DIAG INJ IV PUSH: CPT

## 2023-08-09 PROCEDURE — 700102 HCHG RX REV CODE 250 W/ 637 OVERRIDE(OP): Performed by: STUDENT IN AN ORGANIZED HEALTH CARE EDUCATION/TRAINING PROGRAM

## 2023-08-09 PROCEDURE — 83690 ASSAY OF LIPASE: CPT

## 2023-08-09 PROCEDURE — 700111 HCHG RX REV CODE 636 W/ 250 OVERRIDE (IP): Mod: JZ | Performed by: STUDENT IN AN ORGANIZED HEALTH CARE EDUCATION/TRAINING PROGRAM

## 2023-08-09 PROCEDURE — C9803 HOPD COVID-19 SPEC COLLECT: HCPCS | Performed by: STUDENT IN AN ORGANIZED HEALTH CARE EDUCATION/TRAINING PROGRAM

## 2023-08-09 PROCEDURE — 36415 COLL VENOUS BLD VENIPUNCTURE: CPT

## 2023-08-09 PROCEDURE — 0241U HCHG SARS-COV-2 COVID-19 NFCT DS RESP RNA 4 TRGT MIC: CPT

## 2023-08-09 RX ORDER — POTASSIUM CHLORIDE 20 MEQ/1
20 TABLET, EXTENDED RELEASE ORAL ONCE
Status: COMPLETED | OUTPATIENT
Start: 2023-08-09 | End: 2023-08-09

## 2023-08-09 RX ORDER — SODIUM CHLORIDE, SODIUM LACTATE, POTASSIUM CHLORIDE, CALCIUM CHLORIDE 600; 310; 30; 20 MG/100ML; MG/100ML; MG/100ML; MG/100ML
1000 INJECTION, SOLUTION INTRAVENOUS ONCE
Status: COMPLETED | OUTPATIENT
Start: 2023-08-09 | End: 2023-08-09

## 2023-08-09 RX ORDER — LOPERAMIDE HYDROCHLORIDE 2 MG/1
2 CAPSULE ORAL ONCE
Status: COMPLETED | OUTPATIENT
Start: 2023-08-09 | End: 2023-08-09

## 2023-08-09 RX ORDER — ONDANSETRON 2 MG/ML
4 INJECTION INTRAMUSCULAR; INTRAVENOUS ONCE
Status: COMPLETED | OUTPATIENT
Start: 2023-08-09 | End: 2023-08-09

## 2023-08-09 RX ADMIN — ONDANSETRON 4 MG: 2 INJECTION INTRAMUSCULAR; INTRAVENOUS at 18:40

## 2023-08-09 RX ADMIN — IOHEXOL 100 ML: 350 INJECTION, SOLUTION INTRAVENOUS at 19:00

## 2023-08-09 RX ADMIN — LOPERAMIDE HYDROCHLORIDE 2 MG: 2 CAPSULE ORAL at 20:35

## 2023-08-09 RX ADMIN — SODIUM CHLORIDE, POTASSIUM CHLORIDE, SODIUM LACTATE AND CALCIUM CHLORIDE 1000 ML: 600; 310; 30; 20 INJECTION, SOLUTION INTRAVENOUS at 18:40

## 2023-08-09 RX ADMIN — POTASSIUM CHLORIDE 20 MEQ: 1500 TABLET, EXTENDED RELEASE ORAL at 18:40

## 2023-08-09 ASSESSMENT — LIFESTYLE VARIABLES: DO YOU DRINK ALCOHOL: NO
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
## 2023-08-10 NOTE — DISCHARGE INSTRUCTIONS
As we discussed you should follow-up with your primary care provider listed.  If you have uncontrolled fever, vomiting, worsening abdominal pain, bloody stool you should return to the emergency department.  Be sure to stay hydrated over the next few days.

## 2023-08-10 NOTE — ED NOTES
Bedside report given to ANIKA Cates.   Awaiting medication from pharmacy for discharge.    Call light in reach.
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
## 2023-08-10 NOTE — ED NOTES
Discharge education provided. Patient verbalizes understanding. Patient A/Ox4 and ambulatory to the lobby with a steady gait. Patient discharged home to self care.

## 2023-08-22 ENCOUNTER — APPOINTMENT (OUTPATIENT)
Dept: URGENT CARE | Facility: PHYSICIAN GROUP | Age: 28
End: 2023-08-22

## 2023-12-10 ENCOUNTER — HOSPITAL ENCOUNTER (EMERGENCY)
Facility: MEDICAL CENTER | Age: 28
End: 2023-12-10
Attending: EMERGENCY MEDICINE

## 2023-12-10 ENCOUNTER — APPOINTMENT (OUTPATIENT)
Dept: RADIOLOGY | Facility: MEDICAL CENTER | Age: 28
End: 2023-12-10
Attending: EMERGENCY MEDICINE

## 2023-12-10 VITALS
SYSTOLIC BLOOD PRESSURE: 120 MMHG | TEMPERATURE: 97.6 F | WEIGHT: 200 LBS | OXYGEN SATURATION: 96 % | HEART RATE: 73 BPM | BODY MASS INDEX: 24.36 KG/M2 | RESPIRATION RATE: 18 BRPM | HEIGHT: 76 IN | DIASTOLIC BLOOD PRESSURE: 83 MMHG

## 2023-12-10 DIAGNOSIS — S01.81XA FACIAL LACERATION, INITIAL ENCOUNTER: ICD-10-CM

## 2023-12-10 DIAGNOSIS — Z78.9 ALCOHOL INGESTION: ICD-10-CM

## 2023-12-10 DIAGNOSIS — S09.90XA CLOSED HEAD INJURY, INITIAL ENCOUNTER: ICD-10-CM

## 2023-12-10 LAB
ALBUMIN SERPL BCP-MCNC: 4.6 G/DL (ref 3.2–4.9)
ALBUMIN/GLOB SERPL: 1.6 G/DL
ALP SERPL-CCNC: 87 U/L (ref 30–99)
ALT SERPL-CCNC: 17 U/L (ref 2–50)
ANION GAP SERPL CALC-SCNC: 14 MMOL/L (ref 7–16)
AST SERPL-CCNC: 17 U/L (ref 12–45)
BASOPHILS # BLD AUTO: 0.8 % (ref 0–1.8)
BASOPHILS # BLD: 0.06 K/UL (ref 0–0.12)
BILIRUB SERPL-MCNC: 0.4 MG/DL (ref 0.1–1.5)
BUN SERPL-MCNC: 6 MG/DL (ref 8–22)
CALCIUM ALBUM COR SERPL-MCNC: 8.2 MG/DL (ref 8.5–10.5)
CALCIUM SERPL-MCNC: 8.7 MG/DL (ref 8.5–10.5)
CHLORIDE SERPL-SCNC: 98 MMOL/L (ref 96–112)
CO2 SERPL-SCNC: 27 MMOL/L (ref 20–33)
CREAT SERPL-MCNC: 0.85 MG/DL (ref 0.5–1.4)
EOSINOPHIL # BLD AUTO: 0.11 K/UL (ref 0–0.51)
EOSINOPHIL NFR BLD: 1.5 % (ref 0–6.9)
ERYTHROCYTE [DISTWIDTH] IN BLOOD BY AUTOMATED COUNT: 42.4 FL (ref 35.9–50)
ETHANOL BLD-MCNC: 368.1 MG/DL
GFR SERPLBLD CREATININE-BSD FMLA CKD-EPI: 119 ML/MIN/1.73 M 2
GLOBULIN SER CALC-MCNC: 2.8 G/DL (ref 1.9–3.5)
GLUCOSE SERPL-MCNC: 128 MG/DL (ref 65–99)
HCT VFR BLD AUTO: 46.6 % (ref 42–52)
HGB BLD-MCNC: 16.1 G/DL (ref 14–18)
IMM GRANULOCYTES # BLD AUTO: 0.03 K/UL (ref 0–0.11)
IMM GRANULOCYTES NFR BLD AUTO: 0.4 % (ref 0–0.9)
LYMPHOCYTES # BLD AUTO: 2.41 K/UL (ref 1–4.8)
LYMPHOCYTES NFR BLD: 33.3 % (ref 22–41)
MCH RBC QN AUTO: 32.2 PG (ref 27–33)
MCHC RBC AUTO-ENTMCNC: 34.5 G/DL (ref 32.3–36.5)
MCV RBC AUTO: 93.2 FL (ref 81.4–97.8)
MONOCYTES # BLD AUTO: 0.74 K/UL (ref 0–0.85)
MONOCYTES NFR BLD AUTO: 10.2 % (ref 0–13.4)
NEUTROPHILS # BLD AUTO: 3.89 K/UL (ref 1.82–7.42)
NEUTROPHILS NFR BLD: 53.8 % (ref 44–72)
NRBC # BLD AUTO: 0 K/UL
NRBC BLD-RTO: 0 /100 WBC (ref 0–0.2)
PLATELET # BLD AUTO: 173 K/UL (ref 164–446)
PMV BLD AUTO: 12 FL (ref 9–12.9)
POTASSIUM SERPL-SCNC: 3.7 MMOL/L (ref 3.6–5.5)
PROT SERPL-MCNC: 7.4 G/DL (ref 6–8.2)
RBC # BLD AUTO: 5 M/UL (ref 4.7–6.1)
SODIUM SERPL-SCNC: 139 MMOL/L (ref 135–145)
WBC # BLD AUTO: 7.2 K/UL (ref 4.8–10.8)

## 2023-12-10 PROCEDURE — 304996 HCHG REPAIR-COMPLEX-NEEL ADDL 5 CM

## 2023-12-10 PROCEDURE — 99284 EMERGENCY DEPT VISIT MOD MDM: CPT

## 2023-12-10 PROCEDURE — 303747 HCHG EXTRA SUTURE

## 2023-12-10 PROCEDURE — 700111 HCHG RX REV CODE 636 W/ 250 OVERRIDE (IP): Mod: JZ | Performed by: EMERGENCY MEDICINE

## 2023-12-10 PROCEDURE — 70450 CT HEAD/BRAIN W/O DYE: CPT

## 2023-12-10 PROCEDURE — 80053 COMPREHEN METABOLIC PANEL: CPT

## 2023-12-10 PROCEDURE — 82077 ASSAY SPEC XCP UR&BREATH IA: CPT

## 2023-12-10 PROCEDURE — 304217 HCHG IRRIGATION SYSTEM

## 2023-12-10 PROCEDURE — 85025 COMPLETE CBC W/AUTO DIFF WBC: CPT

## 2023-12-10 PROCEDURE — 304995 HCHG REPAIR-COMPLEX-NEEL 2.6-7.5 CM

## 2023-12-10 PROCEDURE — 36415 COLL VENOUS BLD VENIPUNCTURE: CPT

## 2023-12-10 RX ADMIN — LIDOCAINE HYDROCHLORIDE 20 ML: 10 INJECTION, SOLUTION EPIDURAL; INFILTRATION; INTRACAUDAL; PERINEURAL at 06:45

## 2023-12-10 ASSESSMENT — PAIN DESCRIPTION - PAIN TYPE: TYPE: ACUTE PAIN

## 2023-12-10 ASSESSMENT — FIBROSIS 4 INDEX: FIB4 SCORE: 1.06

## 2023-12-10 NOTE — ED NOTES
Pt friends at bedside. Pt lying in bed, in no apparent distress.     Friends requesting we call them in case pt needs a ride home.     Lavelle MCKEON:  [phone]    Blayne TROY:  [phone]
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
## 2023-12-10 NOTE — ED NOTES
Pt  changed into gown, bed alarm in place, pt provided with warm blankets and water. Pt is sleeping in bed in no apparent distress. VSS on monitor.

## 2023-12-10 NOTE — ED NOTES
BS report received from Gloria DONALDSON.  Pt resting in [facility]. No needs at this time.

## 2023-12-10 NOTE — ED NOTES
Med rec updated and complete. Allergies reviewed.   Confirmed name and date of birth.  Pt denies taking medications.      Home pharmacy  [facility] = [phone]

## 2023-12-10 NOTE — ED NOTES
Pt up self and amb without assistance.  Pt is alert and oriented, speaking in full sentences, follows commands and responds appropriately to questions. NAD. Resp are even and unlabored.

## 2023-12-10 NOTE — ED NOTES
Bedside report received from off going RN/tech: Linsey, assumed care of patient.  POC discussed with patient. Call light within reach, all needs addressed at this time.       Fall risk interventions in place: Move the patient closer to the nurse's station, Patient's personal possessions are with in their safe reach, Place fall risk sign on patient's door, Give patient urinal if applicable, Keep floor surfaces clean and dry, and Accompanied to restroom (all applicable per [place] Fall risk assessment)   Continuous monitoring: Pulse Ox or Blood Pressure  IVF/IV medications: Not Applicable   Oxygen: Room Air  Bedside sitter: Not Applicable   Isolation: Not Applicable

## 2023-12-10 NOTE — ED NOTES
Patient from lobby to room in  accompanied by friend.  Patient ambulated to bathroom independently with steady gait.    Chart up for ERP.

## 2023-12-10 NOTE — DISCHARGE INSTRUCTIONS
Please follow-up with your primary care physician in 2-3 days for wound recheck.  You may take Tylenol or ibuprofen as needed for pain.  After 24 hours, you may wash the wound normally.  Do not soak the area until sutures are removed.  Please follow-up with your primary care physician, urgent care, or this emergency department in 3-5 days for suture removal and wound recheck.  Return to the emergency department immediately if you develop redness or swelling around the wound, pain in the area of the wound, streaking from the wound, drainage from the wound, fevers, or if you develop any other new or worsening symptoms.

## 2023-12-10 NOTE — ED TRIAGE NOTES
Ezio Israel  28 y.o. male  Chief Complaint   Patient presents with    Laceration     R eyebrow/side of eye laceration r/t fall with + head strike, - LOC, - blood thinners onto concrete with rocks.      Pt wheeled in wheelchair with friends to triage. Bleeding controlled in triage.     Pt is alert, oriented, and follows commands. Pt intoxicated. Pt speaking in full sentences and responds appropriately to questions. No acute distress noted in triage and respirations are even and unlabored.     Pt placed in lobby and educated on triage process. Pt encouraged to alert staff for any changes in condition.    Vitals:    12/10/23 0516   BP: (!) 152/111   Pulse: 83   Resp: 16   Temp: 36.3 °C (97.4 °F)   SpO2: 97%

## 2023-12-10 NOTE — ED NOTES
Eyebrow laceration gently irrigated, patient not tolerating well.  Resident at bedside for wound closure

## 2024-03-16 ENCOUNTER — HOSPITAL ENCOUNTER (EMERGENCY)
Facility: MEDICAL CENTER | Age: 29
End: 2024-03-16
Attending: EMERGENCY MEDICINE
Payer: COMMERCIAL

## 2024-03-16 VITALS
RESPIRATION RATE: 19 BRPM | HEIGHT: 76 IN | SYSTOLIC BLOOD PRESSURE: 98 MMHG | BODY MASS INDEX: 24.36 KG/M2 | TEMPERATURE: 97.4 F | HEART RATE: 69 BPM | WEIGHT: 200 LBS | DIASTOLIC BLOOD PRESSURE: 68 MMHG | OXYGEN SATURATION: 93 %

## 2024-03-16 DIAGNOSIS — R56.9 SEIZURE (HCC): ICD-10-CM

## 2024-03-16 DIAGNOSIS — Z91.148 NONCOMPLIANCE WITH MEDICATION REGIMEN: ICD-10-CM

## 2024-03-16 LAB
ALBUMIN SERPL BCP-MCNC: 4.4 G/DL (ref 3.2–4.9)
ALBUMIN/GLOB SERPL: 1.6 G/DL
ALP SERPL-CCNC: 79 U/L (ref 30–99)
ALT SERPL-CCNC: 19 U/L (ref 2–50)
AMPHET UR QL SCN: NEGATIVE
ANION GAP SERPL CALC-SCNC: 16 MMOL/L (ref 7–16)
AST SERPL-CCNC: 21 U/L (ref 12–45)
BARBITURATES UR QL SCN: NEGATIVE
BASOPHILS # BLD AUTO: 0.2 % (ref 0–1.8)
BASOPHILS # BLD: 0.02 K/UL (ref 0–0.12)
BENZODIAZ UR QL SCN: NEGATIVE
BILIRUB SERPL-MCNC: 0.4 MG/DL (ref 0.1–1.5)
BUN SERPL-MCNC: 10 MG/DL (ref 8–22)
BZE UR QL SCN: NEGATIVE
CALCIUM ALBUM COR SERPL-MCNC: 8.3 MG/DL (ref 8.5–10.5)
CALCIUM SERPL-MCNC: 8.6 MG/DL (ref 8.5–10.5)
CANNABINOIDS UR QL SCN: NEGATIVE
CHLORIDE SERPL-SCNC: 105 MMOL/L (ref 96–112)
CO2 SERPL-SCNC: 20 MMOL/L (ref 20–33)
CREAT SERPL-MCNC: 1.14 MG/DL (ref 0.5–1.4)
EOSINOPHIL # BLD AUTO: 0.01 K/UL (ref 0–0.51)
EOSINOPHIL NFR BLD: 0.1 % (ref 0–6.9)
ERYTHROCYTE [DISTWIDTH] IN BLOOD BY AUTOMATED COUNT: 37.1 FL (ref 35.9–50)
FENTANYL UR QL: NEGATIVE
GFR SERPLBLD CREATININE-BSD FMLA CKD-EPI: 88 ML/MIN/1.73 M 2
GLOBULIN SER CALC-MCNC: 2.7 G/DL (ref 1.9–3.5)
GLUCOSE SERPL-MCNC: 109 MG/DL (ref 65–99)
HCT VFR BLD AUTO: 40.4 % (ref 42–52)
HGB BLD-MCNC: 14.5 G/DL (ref 14–18)
IMM GRANULOCYTES # BLD AUTO: 0.04 K/UL (ref 0–0.11)
IMM GRANULOCYTES NFR BLD AUTO: 0.4 % (ref 0–0.9)
LYMPHOCYTES # BLD AUTO: 1.21 K/UL (ref 1–4.8)
LYMPHOCYTES NFR BLD: 11.4 % (ref 22–41)
MAGNESIUM SERPL-MCNC: 2 MG/DL (ref 1.5–2.5)
MCH RBC QN AUTO: 30.7 PG (ref 27–33)
MCHC RBC AUTO-ENTMCNC: 35.9 G/DL (ref 32.3–36.5)
MCV RBC AUTO: 85.6 FL (ref 81.4–97.8)
METHADONE UR QL SCN: NEGATIVE
MONOCYTES # BLD AUTO: 0.84 K/UL (ref 0–0.85)
MONOCYTES NFR BLD AUTO: 7.9 % (ref 0–13.4)
NEUTROPHILS # BLD AUTO: 8.52 K/UL (ref 1.82–7.42)
NEUTROPHILS NFR BLD: 80 % (ref 44–72)
NRBC # BLD AUTO: 0 K/UL
NRBC BLD-RTO: 0 /100 WBC (ref 0–0.2)
OPIATES UR QL SCN: NEGATIVE
OXYCODONE UR QL SCN: NEGATIVE
PCP UR QL SCN: NEGATIVE
PLATELET # BLD AUTO: 135 K/UL (ref 164–446)
PMV BLD AUTO: 13.2 FL (ref 9–12.9)
POTASSIUM SERPL-SCNC: 3.8 MMOL/L (ref 3.6–5.5)
PROPOXYPH UR QL SCN: NEGATIVE
PROT SERPL-MCNC: 7.1 G/DL (ref 6–8.2)
RBC # BLD AUTO: 4.72 M/UL (ref 4.7–6.1)
SODIUM SERPL-SCNC: 141 MMOL/L (ref 135–145)
WBC # BLD AUTO: 10.6 K/UL (ref 4.8–10.8)

## 2024-03-16 PROCEDURE — 36415 COLL VENOUS BLD VENIPUNCTURE: CPT

## 2024-03-16 PROCEDURE — 99284 EMERGENCY DEPT VISIT MOD MDM: CPT

## 2024-03-16 PROCEDURE — 80053 COMPREHEN METABOLIC PANEL: CPT

## 2024-03-16 PROCEDURE — 83735 ASSAY OF MAGNESIUM: CPT

## 2024-03-16 PROCEDURE — 96374 THER/PROPH/DIAG INJ IV PUSH: CPT

## 2024-03-16 PROCEDURE — 80307 DRUG TEST PRSMV CHEM ANLYZR: CPT

## 2024-03-16 PROCEDURE — 94760 N-INVAS EAR/PLS OXIMETRY 1: CPT

## 2024-03-16 PROCEDURE — 700111 HCHG RX REV CODE 636 W/ 250 OVERRIDE (IP): Mod: JZ | Performed by: EMERGENCY MEDICINE

## 2024-03-16 PROCEDURE — 85025 COMPLETE CBC W/AUTO DIFF WBC: CPT

## 2024-03-16 RX ORDER — LEVETIRACETAM 500 MG/5ML
500 INJECTION, SOLUTION, CONCENTRATE INTRAVENOUS ONCE
Status: COMPLETED | OUTPATIENT
Start: 2024-03-16 | End: 2024-03-16

## 2024-03-16 RX ADMIN — LEVETIRACETAM 500 MG: 100 INJECTION, SOLUTION, CONCENTRATE INTRAVENOUS at 03:36

## 2024-03-16 ASSESSMENT — FIBROSIS 4 INDEX: FIB4 SCORE: 0.71

## 2024-03-16 ASSESSMENT — LIFESTYLE VARIABLES: DO YOU DRINK ALCOHOL: NO

## 2024-03-16 NOTE — ED TRIAGE NOTES
"Chief Complaint   Patient presents with    Seizure     Pt reportedly had 3 seizures in the retirement today lasting approx 10 sec each.     Pt BIB EMS for above complaint. Per retirement RN, pt had 3 seizures since day shift. [facility] staff reports pt was altered and combative in the retirement during seizure activity.     Pt reports history of seizure but non-compliant with Keppra    Pt A+Ox4. GCS 15, NAD.    /68   Pulse 72   Temp 36.7 °C (98.1 °F) (Temporal)   Resp 16   Ht 1.93 m (6' 4\")   Wt 90.7 kg (200 lb)   SpO2 97%   BMI 24.34 kg/m²     "

## 2024-03-16 NOTE — ED NOTES
Reviewed discharge instructions with pt. Verbalized understanding of instructions. Will follow up as directed. Ambulatory out of ER with steady gait escorted by [facility].

## 2024-03-16 NOTE — ED PROVIDER NOTES
"                                                        ED Provider Note    CHIEF COMPLAINT  Chief Complaint   Patient presents with    Seizure     Pt reportedly had 3 seizures in the correction today lasting approx 10 sec each.        HPI    Primary care provider: Jose Antonio Rodriguez M.D. (Inactive)   History obtained from: Patient and [facility] deputies  History limited by: None     Thirtyfophyllis Benitez is a 28 y.o. male who presents to the ED by EMS from correction for 3 separate episodes of seizure today each lasting approximately 10 seconds without reported injury or trauma.  Patient denies tongue injury or incontinence.  He has history of seizures and states that he has not taken his Keppra for \"a long time\" because \"I do not like the way it makes me feel.\"  He denies recent illness/fever/cough/shortness of breath or difficulty breathing/nausea/vomiting/diarrhea/dysuria.  He denies any visual change/weakness or sensory change.  [facility]'s deputy reports that patient had 3 episodes of of seizures today, 2 during dayshift and 1 during night shift.    REVIEW OF SYSTEMS  Please see HPI for pertinent positives/negatives.  All other systems reviewed and are negative.     PAST MEDICAL HISTORY  Past Medical History:   Diagnosis Date    Seizure disorder (HCC)     Substance abuse (HCC)         SURGICAL HISTORY  Past Surgical History:   Procedure Laterality Date    WRIST FUSION Left     Patient reports left wrist surgery at age 2        SOCIAL HISTORY  Social History     Tobacco Use    Smoking status: Every Day     Current packs/day: 0.50     Average packs/day: 0.5 packs/day for 10.0 years (5.0 ttl pk-yrs)     Types: Cigarettes    Smokeless tobacco: Never    Tobacco comments:     tried nicotine patch but did not help.    Substance and Sexual Activity    Alcohol use: Yes     Comment: occ    Drug use: Yes     Frequency: 14.0 times per week     Types: Inhaled, Marijuana     Comment: marijuana     Sexual activity: Yes     Partners: Female     " "Birth control/protection: Condom        FAMILY HISTORY  Family History   Problem Relation Age of Onset    Stroke Mother 46    Cancer Maternal Grandmother         Breast cancer     Seizures Neg Hx     Diabetes Neg Hx         CURRENT MEDICATIONS  Home Medications       Reviewed by Иван Tapia R.N. (Registered Nurse) on 03/16/24 at 0205  Med List Status: Partial     Medication Last Dose Status        Patient Ronni Taking any Medications                            ALLERGIES  Allergies   Allergen Reactions    Penicillins Hives    Augmentin     Gabapentin Unspecified     Patient not sure of reaction    Lortab [Hydrocodone-Acetaminophen] Rash    Vimpat Hives        PHYSICAL EXAM  VITAL SIGNS: BP 98/68   Pulse 69   Temp 36.3 °C (97.4 °F) (Temporal)   Resp 19   Ht 1.93 m (6' 4\")   Wt 90.7 kg (200 lb)   SpO2 93%   BMI 24.34 kg/m²  @[id number]::@     Pulse ox interpretation: 97% I interpret this pulse ox as normal     Cardiac monitor interpretation: Sinus rhythm with heart rate in the 60s as interpreted by me.  The patient presented with seizure episodes and cardiac monitor was ordered to monitor for dysrhythmia.    Constitutional: Well developed, well nourished, alert in no apparent distress, nontoxic appearance    HENT: No external signs of trauma, normocephalic, oropharynx with dry lips, no apparent tongue injury  Eyes: PERRL, EOMI, conjunctiva without erythema, no discharge, no icterus    Neck: Soft and supple, trachea midline, no stridor, no tenderness, no LAD, good ROM    Cardiovascular: Regular rate and rhythm, no murmurs/rubs/gallops, strong distal pulses and good perfusion    Thorax & Lungs: No respiratory distress, CTAB    Abdomen: Soft, nontender, nondistended, no guarding, no rebound, normal BS    Back: No CVAT     Extremities: No cyanosis, no edema, no gross deformity, intact distal pulses with brisk cap refill    Skin: Warm, dry, no pallor/cyanosis, no rash noted      Neuro: A/O times 3, no focal " deficits noted    Psychiatric: Cooperative, flat affect      DIAGNOSTIC STUDIES / PROCEDURES        LABS  All labs reviewed by me.     Results for orders placed or performed during the hospital encounter of 03/16/24   CBC WITH DIFFERENTIAL   Result Value Ref Range    WBC 10.6 4.8 - 10.8 K/uL    RBC 4.72 4.70 - 6.10 M/uL    Hemoglobin 14.5 14.0 - 18.0 g/dL    Hematocrit 40.4 (L) 42.0 - 52.0 %    MCV 85.6 81.4 - 97.8 fL    MCH 30.7 27.0 - 33.0 pg    MCHC 35.9 32.3 - 36.5 g/dL    RDW 37.1 35.9 - 50.0 fL    Platelet Count 135 (L) 164 - 446 K/uL    MPV 13.2 (H) 9.0 - 12.9 fL    Neutrophils-Polys 80.00 (H) 44.00 - 72.00 %    Lymphocytes 11.40 (L) 22.00 - 41.00 %    Monocytes 7.90 0.00 - 13.40 %    Eosinophils 0.10 0.00 - 6.90 %    Basophils 0.20 0.00 - 1.80 %    Immature Granulocytes 0.40 0.00 - 0.90 %    Nucleated RBC 0.00 0.00 - 0.20 /100 WBC    Neutrophils (Absolute) 8.52 (H) 1.82 - 7.42 K/uL    Lymphs (Absolute) 1.21 1.00 - 4.80 K/uL    Monos (Absolute) 0.84 0.00 - 0.85 K/uL    Eos (Absolute) 0.01 0.00 - 0.51 K/uL    Baso (Absolute) 0.02 0.00 - 0.12 K/uL    Immature Granulocytes (abs) 0.04 0.00 - 0.11 K/uL    NRBC (Absolute) 0.00 K/uL   COMP METABOLIC PANEL   Result Value Ref Range    Sodium 141 135 - 145 mmol/L    Potassium 3.8 3.6 - 5.5 mmol/L    Chloride 105 96 - 112 mmol/L    Co2 20 20 - 33 mmol/L    Anion Gap 16.0 7.0 - 16.0    Glucose 109 (H) 65 - 99 mg/dL    Bun 10 8 - 22 mg/dL    Creatinine 1.14 0.50 - 1.40 mg/dL    Calcium 8.6 8.5 - 10.5 mg/dL    Correct Calcium 8.3 (L) 8.5 - 10.5 mg/dL    AST(SGOT) 21 12 - 45 U/L    ALT(SGPT) 19 2 - 50 U/L    Alkaline Phosphatase 79 30 - 99 U/L    Total Bilirubin 0.4 0.1 - 1.5 mg/dL    Albumin 4.4 3.2 - 4.9 g/dL    Total Protein 7.1 6.0 - 8.2 g/dL    Globulin 2.7 1.9 - 3.5 g/dL    A-G Ratio 1.6 g/dL   MAGNESIUM   Result Value Ref Range    Magnesium 2.0 1.5 - 2.5 mg/dL   URINE DRUG SCREEN   Result Value Ref Range    Amphetamines Urine Negative Negative    Barbiturates  Negative Negative    Benzodiazepines Negative Negative    Cocaine Metabolite Negative Negative    Fentanyl, Urine Negative Negative    Methadone Negative Negative    Opiates Negative Negative    Oxycodone Negative Negative    Phencyclidine -Pcp Negative Negative    Propoxyphene Negative Negative    Cannabinoid Metab Negative Negative   ESTIMATED GFR   Result Value Ref Range    GFR (CKD-EPI) 88 >60 mL/min/1.73 m 2        RADIOLOGY  I have independently interpreted the diagnostic imaging associated with this visit and am waiting the final reading from the radiologist.     No orders to display          COURSE & MEDICAL DECISION MAKING  Nursing notes, VS, PMSFHx reviewed in chart.     Review of past medical records shows the patient was last seen in this ED December 10, 2023 for facial laceration.  He was seen at [facility] ED on December 21, 2023 for suture removal and medical clearance for incarceration.  MRI brain with and without contrast on July 20, 2018 had the following findings:    1.  3 areas of LEFT frontal and temporal hemorrhagic contusion without discernible mass effect or associated enhancement  2.  No evidence of gray matter heterotopia, gross cortical dysplasia or mesial temporal sclerosis  3.  In light of the patient's reported long-standing history of seizure disorder, follow-up MRI without and with contrast in 3 months is recommended    Patient has had multiple CT head with the last CT head on December 10, 2023 with the following findings:    1. No intrarenal injury.  2. Right supraorbital soft tissue swelling and laceration noted.      Differential diagnoses considered include but are not limited to: Seizure, status epilepticus, CVA/intracranial hemorrhage, syncope, dysrhythmia, electrolyte abnormality, medication noncompliance      ED Observation Status? Yes; I am placing the patient in to an observation status due to a diagnostic uncertainty as well as therapeutic intensity. Patient placed in  observation status at 2:30 AM, 3/16/2024.     Observation plan is as follows: Will obtain laboratory testing and monitor patient in the ED under seizure precaution.    Upon Reevaluation, the patient's condition has: Remained stable and will be discharged.    Patient discharged from ED Observation status at 0557 on March 16, 2024.      INITIAL ASSESSMENT AND PLAN  Care Narrative: This is a 28-year-old male patient with history of seizure disorder not taking his Keppra brought in by EMS from residential for 3 episodes of seizure today.  Initial exam in the ED shows an alert and cooperative patient without focal neurological findings.  Will obtain laboratory testing and patient will be treated with a dose of Keppra and closely monitored in the ED.      Discussion of management with other QHP or appropriate source(s): None     Escalation of care considered, and ultimately not performed: diagnostic imaging and acute inpatient care management, however at this time, the patient is most appropriate for outpatient management.     Decision tools and prescription drugs considered including, but not limited to: Medication modification   .        History and physical exam as above.  Laboratory testing fairly unremarkable.  Patient without leukocytosis or fever in the ED.  His mild thrombocytopenia is similar in level to previous episodes.  No significant electrolyte derangement or evidence for renal or hepatic dysfunction.  UDS negative.  Patient was closely monitored in the ED and remained clinically stable.  He tolerated oral intake without difficulty.  He was given a dose of Keppra.  No worrisome dysrhythmia or hemodynamic instability noted.  No seizure episode noted.  I discussed the findings with patient.  He is alert, in no acute distress and nontoxic in appearance and without focal neurological findings.  At this time, I do not believe emergent neuroimaging or lumbar puncture is necessary.  Patient declined Keppra prescription.   He is aware of the risks of recurrent seizures if he does not take his seizure medicine.  He was advised on outpatient follow-up and given return to ED precautions.  Patient verbalized understanding and agreed with plan of care with no further questions or concerns and discharged with law enforcement officers.      The patient is referred to a primary physician for blood pressure management, diabetic screening, and for all other preventative health concerns.       FINAL IMPRESSION  1. Seizure (HCC) Acute   2. Noncompliance with medication regimen Active          DISPOSITION  Patient will be discharged to care home in stable condition.       FOLLOW UP  Please follow-up with your doctor    Call in 2 days      [facility], Emergency Dept  [facility] [facility] [address] [phone]    If symptoms worsen         OUTPATIENT MEDICATIONS  There are no discharge medications for this patient.         Electronically signed by: Reed Mauro D.O., 3/16/2024 2:29 AM      Portions of this record were made with voice recognition software.  Despite my review, errors may remain.  Please interpret this chart in the appropriate context.